# Patient Record
Sex: FEMALE | Race: WHITE | NOT HISPANIC OR LATINO | Employment: FULL TIME | URBAN - METROPOLITAN AREA
[De-identification: names, ages, dates, MRNs, and addresses within clinical notes are randomized per-mention and may not be internally consistent; named-entity substitution may affect disease eponyms.]

---

## 2017-02-21 PROBLEM — Z72.0 TOBACCO ABUSE: Status: ACTIVE | Noted: 2017-02-21

## 2017-02-21 PROBLEM — F98.8 ADD (ATTENTION DEFICIT DISORDER) WITHOUT HYPERACTIVITY: Status: ACTIVE | Noted: 2017-02-21

## 2017-02-21 PROBLEM — R00.2 PALPITATION: Status: ACTIVE | Noted: 2017-02-21

## 2017-02-27 ENCOUNTER — OFFICE VISIT (OUTPATIENT)
Dept: FAMILY MEDICINE CLINIC | Facility: CLINIC | Age: 22
End: 2017-02-27

## 2017-02-27 VITALS
HEART RATE: 99 BPM | HEIGHT: 65 IN | SYSTOLIC BLOOD PRESSURE: 90 MMHG | WEIGHT: 103 LBS | DIASTOLIC BLOOD PRESSURE: 58 MMHG | BODY MASS INDEX: 17.16 KG/M2 | OXYGEN SATURATION: 96 % | TEMPERATURE: 97.8 F

## 2017-02-27 DIAGNOSIS — G43.909 MIGRAINE WITHOUT STATUS MIGRAINOSUS, NOT INTRACTABLE, UNSPECIFIED MIGRAINE TYPE: Primary | ICD-10-CM

## 2017-02-27 DIAGNOSIS — Z72.0 TOBACCO ABUSE: ICD-10-CM

## 2017-02-27 DIAGNOSIS — F98.8 ADD (ATTENTION DEFICIT DISORDER): ICD-10-CM

## 2017-02-27 PROCEDURE — 99214 OFFICE O/P EST MOD 30 MIN: CPT | Performed by: PHYSICIAN ASSISTANT

## 2017-02-27 PROCEDURE — 99406 BEHAV CHNG SMOKING 3-10 MIN: CPT | Performed by: PHYSICIAN ASSISTANT

## 2017-02-27 RX ORDER — TOPIRAMATE 25 MG/1
25 TABLET ORAL 2 TIMES DAILY
Qty: 60 TABLET | Refills: 11 | Status: SHIPPED | OUTPATIENT
Start: 2017-02-27 | End: 2017-11-06

## 2017-04-21 ENCOUNTER — HOSPITAL ENCOUNTER (EMERGENCY)
Facility: HOSPITAL | Age: 22
Discharge: HOME | End: 2017-04-21
Attending: STUDENT IN AN ORGANIZED HEALTH CARE EDUCATION/TRAINING PROGRAM | Admitting: STUDENT IN AN ORGANIZED HEALTH CARE EDUCATION/TRAINING PROGRAM

## 2017-04-21 VITALS
BODY MASS INDEX: 17.66 KG/M2 | TEMPERATURE: 98.2 F | HEART RATE: 136 BPM | DIASTOLIC BLOOD PRESSURE: 82 MMHG | RESPIRATION RATE: 17 BRPM | OXYGEN SATURATION: 100 % | WEIGHT: 106 LBS | HEIGHT: 65 IN | SYSTOLIC BLOOD PRESSURE: 117 MMHG

## 2017-04-21 DIAGNOSIS — R14.0 BLOATING SYMPTOM: Primary | ICD-10-CM

## 2017-04-21 LAB
ALBUMIN SERPL-MCNC: 4.6 G/DL (ref 3.5–5)
ALBUMIN/GLOB SERPL: 1.2 G/DL (ref 1–2)
ALP SERPL-CCNC: 74 U/L (ref 38–126)
ALT SERPL W P-5'-P-CCNC: 28 U/L (ref 13–69)
ANION GAP SERPL CALCULATED.3IONS-SCNC: 20 MMOL/L
AST SERPL-CCNC: 24 U/L (ref 15–46)
B-HCG UR QL: NEGATIVE
BACTERIA UR QL AUTO: ABNORMAL /HPF
BASOPHILS # BLD AUTO: 0.06 10*3/MM3 (ref 0–0.2)
BASOPHILS NFR BLD AUTO: 0.9 % (ref 0–2.5)
BILIRUB SERPL-MCNC: 0.6 MG/DL (ref 0.2–1.3)
BILIRUB UR QL STRIP: ABNORMAL
BUN BLD-MCNC: 13 MG/DL (ref 7–20)
BUN/CREAT SERPL: 18.6 (ref 7.1–23.5)
CALCIUM SPEC-SCNC: 9.4 MG/DL (ref 8.4–10.2)
CHLORIDE SERPL-SCNC: 101 MMOL/L (ref 98–107)
CLARITY UR: CLEAR
CO2 SERPL-SCNC: 28 MMOL/L (ref 26–30)
COLOR UR: YELLOW
CREAT BLD-MCNC: 0.7 MG/DL (ref 0.6–1.3)
DEPRECATED RDW RBC AUTO: 39.5 FL (ref 37–54)
EOSINOPHIL # BLD AUTO: 0.12 10*3/MM3 (ref 0–0.7)
EOSINOPHIL NFR BLD AUTO: 1.8 % (ref 0–7)
ERYTHROCYTE [DISTWIDTH] IN BLOOD BY AUTOMATED COUNT: 12 % (ref 11.5–14.5)
GFR SERPL CREATININE-BSD FRML MDRD: 105 ML/MIN/1.73
GLOBULIN UR ELPH-MCNC: 3.7 GM/DL
GLUCOSE BLD-MCNC: 93 MG/DL (ref 74–98)
GLUCOSE UR STRIP-MCNC: NEGATIVE MG/DL
HCT VFR BLD AUTO: 43.3 % (ref 37–47)
HGB BLD-MCNC: 14.9 G/DL (ref 12–16)
HGB UR QL STRIP.AUTO: ABNORMAL
HYALINE CASTS UR QL AUTO: ABNORMAL /LPF
IMM GRANULOCYTES # BLD: 0.02 10*3/MM3 (ref 0–0.06)
IMM GRANULOCYTES NFR BLD: 0.3 % (ref 0–0.6)
KETONES UR QL STRIP: ABNORMAL
LEUKOCYTE ESTERASE UR QL STRIP.AUTO: NEGATIVE
LYMPHOCYTES # BLD AUTO: 2.14 10*3/MM3 (ref 0.6–3.4)
LYMPHOCYTES NFR BLD AUTO: 32.3 % (ref 10–50)
MCH RBC QN AUTO: 30.8 PG (ref 27–31)
MCHC RBC AUTO-ENTMCNC: 34.4 G/DL (ref 30–37)
MCV RBC AUTO: 89.6 FL (ref 81–99)
MONOCYTES # BLD AUTO: 0.46 10*3/MM3 (ref 0–0.9)
MONOCYTES NFR BLD AUTO: 6.9 % (ref 0–12)
NEUTROPHILS # BLD AUTO: 3.82 10*3/MM3 (ref 2–6.9)
NEUTROPHILS NFR BLD AUTO: 57.8 % (ref 37–80)
NITRITE UR QL STRIP: NEGATIVE
NRBC BLD MANUAL-RTO: 0 /100 WBC (ref 0–0)
PH UR STRIP.AUTO: 5.5 [PH] (ref 5–8)
PLATELET # BLD AUTO: 194 10*3/MM3 (ref 130–400)
PMV BLD AUTO: 11.5 FL (ref 6–12)
POTASSIUM BLD-SCNC: 4 MMOL/L (ref 3.5–5.1)
PROT SERPL-MCNC: 8.3 G/DL (ref 6.3–8.2)
PROT UR QL STRIP: ABNORMAL
RBC # BLD AUTO: 4.83 10*6/MM3 (ref 4.2–5.4)
RBC # UR: ABNORMAL /HPF
REF LAB TEST METHOD: ABNORMAL
SODIUM BLD-SCNC: 145 MMOL/L (ref 137–145)
SP GR UR STRIP: >=1.03 (ref 1–1.03)
SQUAMOUS #/AREA URNS HPF: ABNORMAL /HPF
UROBILINOGEN UR QL STRIP: ABNORMAL
WBC NRBC COR # BLD: 6.62 10*3/MM3 (ref 4.8–10.8)
WBC UR QL AUTO: ABNORMAL /HPF

## 2017-04-21 PROCEDURE — 85025 COMPLETE CBC W/AUTO DIFF WBC: CPT | Performed by: NURSE PRACTITIONER

## 2017-04-21 PROCEDURE — 81001 URINALYSIS AUTO W/SCOPE: CPT | Performed by: STUDENT IN AN ORGANIZED HEALTH CARE EDUCATION/TRAINING PROGRAM

## 2017-04-21 PROCEDURE — 99284 EMERGENCY DEPT VISIT MOD MDM: CPT

## 2017-04-21 PROCEDURE — 81025 URINE PREGNANCY TEST: CPT | Performed by: STUDENT IN AN ORGANIZED HEALTH CARE EDUCATION/TRAINING PROGRAM

## 2017-04-21 PROCEDURE — 80053 COMPREHEN METABOLIC PANEL: CPT | Performed by: NURSE PRACTITIONER

## 2017-04-21 PROCEDURE — 87491 CHLMYD TRACH DNA AMP PROBE: CPT | Performed by: NURSE PRACTITIONER

## 2017-04-21 PROCEDURE — 87591 N.GONORRHOEAE DNA AMP PROB: CPT | Performed by: NURSE PRACTITIONER

## 2017-04-21 NOTE — ED PROVIDER NOTES
"Subjective   History of Present Illness  This is a 22-year-old patient presents to the emergency department today with positive for nausea over the past 2 weeks she denies any diarrhea or vomiting she states that she's been having some abdominal bloating since that time she also complains of some mild watery vaginal discharge with odor.  .   Review of Systems   Constitutional: Negative.    HENT: Negative.    Eyes: Negative.    Respiratory: Negative.    Cardiovascular: Negative.    Gastrointestinal: Negative.    Genitourinary: Negative.    Skin: Negative.    Neurological: Negative.    Psychiatric/Behavioral: Negative.        Past Medical History:   Diagnosis Date   • ADD (attention deficit disorder) without hyperactivity    • Palpitations    • PVC (premature ventricular contraction)        No Known Allergies    Past Surgical History:   Procedure Laterality Date   • WISDOM TOOTH EXTRACTION         Family History   Problem Relation Age of Onset   • No Known Problems Mother    • No Known Problems Father    • No Known Problems Sister    • No Known Problems Brother        Social History     Social History   • Marital status: Single     Spouse name: N/A   • Number of children: N/A   • Years of education: N/A     Social History Main Topics   • Smoking status: Current Every Day Smoker     Packs/day: 0.50     Types: Cigarettes   • Smokeless tobacco: Never Used   • Alcohol use Yes      Comment: \"OCCASIONALL\"   • Drug use: No   • Sexual activity: Not Asked     Other Topics Concern   • None     Social History Narrative   • None           Objective   Physical Exam   Constitutional: She appears well-developed and well-nourished.   Nursing note and vitals reviewed.  GEN: No acute distress  Head: Normocephalic, atraumatic  Eyes: Pupils equal round reactive to light  ENT: Posterior pharynx normal in appearance, oral mucosa is moist  Chest: Nontender to palpation  Cardiovascular: Regular rate  Lungs: Clear to auscultation " bilaterally  Abdomen: Soft, nontender, nondistended, no peritoneal signs  Extremities: No edema, normal appearance  Neuro: GCS 15  Psych: Mood and affect are appropriate      Procedures         ED Course  ED Course                  MDM  Number of Diagnoses or Management Options  Bloating symptom:   Diagnosis management comments: Fragile diagnoses includes resolving gastroenteritis, STI, urinary tract infection.  Labs unremarkable STI lab testing is a sendoff we will get those labs a later date for the patient she is agreeable to follow up with her primary care provider.      Final diagnoses:   Bloating symptom            Mery Orozco, APRN  04/21/17 [id number]

## 2017-04-26 LAB
C TRACH RRNA SPEC DONR QL NAA+PROBE: NEGATIVE
N GONORRHOEA DNA SPEC QL NAA+PROBE: NEGATIVE

## 2017-05-01 ENCOUNTER — OFFICE VISIT (OUTPATIENT)
Dept: FAMILY MEDICINE CLINIC | Facility: CLINIC | Age: 22
End: 2017-05-01

## 2017-05-01 VITALS
BODY MASS INDEX: 16.66 KG/M2 | DIASTOLIC BLOOD PRESSURE: 62 MMHG | TEMPERATURE: 97.9 F | WEIGHT: 100 LBS | HEIGHT: 65 IN | OXYGEN SATURATION: 98 % | SYSTOLIC BLOOD PRESSURE: 102 MMHG | HEART RATE: 106 BPM

## 2017-05-01 DIAGNOSIS — F98.8 ADD (ATTENTION DEFICIT DISORDER): Primary | ICD-10-CM

## 2017-05-01 PROCEDURE — 99213 OFFICE O/P EST LOW 20 MIN: CPT | Performed by: PHYSICIAN ASSISTANT

## 2017-07-03 ENCOUNTER — OFFICE VISIT (OUTPATIENT)
Dept: FAMILY MEDICINE CLINIC | Facility: CLINIC | Age: 22
End: 2017-07-03

## 2017-07-03 VITALS
RESPIRATION RATE: 14 BRPM | HEART RATE: 103 BPM | HEIGHT: 65 IN | BODY MASS INDEX: 16.29 KG/M2 | SYSTOLIC BLOOD PRESSURE: 98 MMHG | WEIGHT: 97.8 LBS | OXYGEN SATURATION: 98 % | DIASTOLIC BLOOD PRESSURE: 62 MMHG

## 2017-07-03 DIAGNOSIS — F98.8 ADD (ATTENTION DEFICIT DISORDER) WITHOUT HYPERACTIVITY: Primary | ICD-10-CM
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
## 2017-07-03 PROCEDURE — 99213 OFFICE O/P EST LOW 20 MIN: CPT | Performed by: PHYSICIAN ASSISTANT

## 2017-07-03 RX ORDER — TRIAMCINOLONE ACETONIDE 1 MG/G
CREAM TOPICAL 2 TIMES DAILY
Qty: 30 G | Refills: 1 | Status: SHIPPED | OUTPATIENT
Start: 2017-07-03 | End: 2018-08-09

## 2017-07-03 NOTE — PROGRESS NOTES
Subjective   Gabriela Epperson is a 22 y.o. female    History of Present Illness  Patient pleasant 22-year-old white female comes in follow-up of ADD, states that meds working well she takes Adderall 15 mg twice a day concentration is good, focus is good while taking medication no SI/HI.      The following portions of the patient's history were reviewed and updated as appropriate: allergies, current medications, past social history and problem list    Review of Systems   Constitutional: Negative for appetite change, diaphoresis, fatigue and unexpected weight change.   Eyes: Negative for visual disturbance.   Respiratory: Negative for cough, chest tightness and shortness of breath.    Cardiovascular: Negative for chest pain, palpitations and leg swelling.   Gastrointestinal: Negative for diarrhea, nausea and vomiting.   Endocrine: Negative for polydipsia, polyphagia and polyuria.   Skin: Negative for color change and rash.   Neurological: Negative for dizziness, syncope, weakness, light-headedness, numbness and headaches.       Objective     Vitals:    07/03/17 1419   BP: 98/62   Pulse: 103   Resp: 14   SpO2: 98%       Physical Exam   Constitutional: She appears well-developed and well-nourished.   Neck: No JVD present.   Cardiovascular: Normal rate, regular rhythm, normal heart sounds, intact distal pulses and normal pulses.    No murmur heard.  Pulmonary/Chest: Effort normal and breath sounds normal. No respiratory distress.   Abdominal: Soft. Bowel sounds are normal. There is no hepatosplenomegaly. There is no tenderness.   Musculoskeletal: She exhibits no edema.   Skin: Skin is warm and dry.        Nursing note and vitals reviewed.      Assessment/Plan     Diagnoses and all orders for this visit:    ADD (attention deficit disorder) without hyperactivity    Bug bite, initial encounter  -     triamcinolone (KENALOG) 0.1 % cream; Apply  topically 2 (Two) Times a Day.    #1 Adderall 15mg twice day spent 60    2  prescriptions written    #2 Kenalog cream 0.1% apply twice a day dispense 30 g    Follow-up as needed

## 2017-09-05 ENCOUNTER — OFFICE VISIT (OUTPATIENT)
Dept: FAMILY MEDICINE CLINIC | Facility: CLINIC | Age: 22
End: 2017-09-05

## 2017-09-05 VITALS
BODY MASS INDEX: 15.99 KG/M2 | RESPIRATION RATE: 14 BRPM | HEART RATE: 90 BPM | SYSTOLIC BLOOD PRESSURE: 96 MMHG | HEIGHT: 65 IN | OXYGEN SATURATION: 97 % | DIASTOLIC BLOOD PRESSURE: 64 MMHG | WEIGHT: 96 LBS | TEMPERATURE: 98.4 F

## 2017-09-05 DIAGNOSIS — J01.00 ACUTE MAXILLARY SINUSITIS, RECURRENCE NOT SPECIFIED: Primary | ICD-10-CM

## 2017-09-05 DIAGNOSIS — F98.8 ADD (ATTENTION DEFICIT DISORDER) WITHOUT HYPERACTIVITY: ICD-10-CM

## 2017-09-05 PROCEDURE — 99214 OFFICE O/P EST MOD 30 MIN: CPT | Performed by: PHYSICIAN ASSISTANT

## 2017-09-05 RX ORDER — CEFDINIR 300 MG/1
300 CAPSULE ORAL 2 TIMES DAILY
Qty: 20 CAPSULE | Refills: 0 | Status: SHIPPED | OUTPATIENT
Start: 2017-09-05 | End: 2017-11-06

## 2017-09-05 NOTE — PROGRESS NOTES
Subjective   Gabriela Epperson is a 22 y.o. female    History of Present Illness  Patient is 20-year-old white female comes in plan of sinus pressure sinus congestion blowing green-yellow drainage from nose.  Mild frontal headache mild sore throat, mild cough.  Patient's pain with swallowing no OTC meds used    Patient comes in follow-up of ADD, meds working well takes Adderall 15 mg 3 times a day dispense 60, concentration is good, focus is good doing well medication no problems or complaints  The following portions of the patient's history were reviewed and updated as appropriate: allergies, current medications, past social history and problem list    Review of Systems   Constitutional: Negative for appetite change, chills, diaphoresis, fatigue, fever and unexpected weight change.   HENT: Positive for congestion, ear pain, postnasal drip, rhinorrhea and sinus pressure. Negative for sore throat.    Eyes: Positive for pain. Negative for visual disturbance.   Respiratory: Positive for cough. Negative for chest tightness and shortness of breath.    Cardiovascular: Negative for chest pain, palpitations and leg swelling.   Gastrointestinal: Negative for diarrhea, nausea and vomiting.   Endocrine: Negative for polydipsia, polyphagia and polyuria.   Skin: Negative for color change and rash.   Neurological: Positive for headaches. Negative for dizziness, syncope, weakness, light-headedness and numbness.   Hematological: Negative for adenopathy.       Objective     Vitals:    09/05/17 1246   BP: 96/64   Pulse: 90   Resp: 14   Temp: 98.4 °F (36.9 °C)   SpO2: 97%       Physical Exam   Constitutional: She appears well-developed and well-nourished.   HENT:   Head: Normocephalic and atraumatic.   Right Ear: Tympanic membrane and ear canal normal.   Left Ear: Tympanic membrane and ear canal normal.   Nose: Mucosal edema, rhinorrhea and sinus tenderness present. Right sinus exhibits maxillary sinus tenderness and frontal sinus  tenderness. Left sinus exhibits maxillary sinus tenderness and frontal sinus tenderness.   Mouth/Throat: Oropharynx is clear and moist. No oropharyngeal exudate.   Eyes: Pupils are equal, round, and reactive to light.   Neck: Neck supple. No JVD present. No thyromegaly present.   Cardiovascular: Normal rate, regular rhythm, normal heart sounds, intact distal pulses and normal pulses.    No murmur heard.  Pulmonary/Chest: Effort normal and breath sounds normal. No respiratory distress.   Abdominal: Soft. Bowel sounds are normal. There is no hepatosplenomegaly. There is no tenderness.   Musculoskeletal: She exhibits no edema.   Lymphadenopathy:     She has no cervical adenopathy.   Neurological: No sensory deficit.   Skin: Skin is warm and dry. She is not diaphoretic.   Nursing note and vitals reviewed.      Assessment/Plan     Diagnoses and all orders for this visit:    Acute maxillary sinusitis, recurrence not specified  -     cefdinir (OMNICEF) 300 MG capsule; Take 1 capsule by mouth 2 (Two) Times a Day.    ADD (attention deficit disorder) without hyperactivity      #1 Omnicef 300 mg 2 by mouth everyday dispense 20    OTC guaifenesin    #2 Adderall 15 mg 1 by mouth twice a day dispensed 60    2 Prescriptions written.     discuss treatment plan, long-term treatment plan discussed ways improved focus and concentration warned of abuse potential

## 2017-11-06 ENCOUNTER — OFFICE VISIT (OUTPATIENT)
Dept: FAMILY MEDICINE CLINIC | Facility: CLINIC | Age: 22
End: 2017-11-06

## 2017-11-06 VITALS
SYSTOLIC BLOOD PRESSURE: 94 MMHG | RESPIRATION RATE: 14 BRPM | DIASTOLIC BLOOD PRESSURE: 68 MMHG | WEIGHT: 99 LBS | HEIGHT: 65 IN | BODY MASS INDEX: 16.5 KG/M2 | HEART RATE: 94 BPM | OXYGEN SATURATION: 97 %

## 2017-11-06 DIAGNOSIS — F41.9 ANXIETY: ICD-10-CM

## 2017-11-06 DIAGNOSIS — F98.8 ADD (ATTENTION DEFICIT DISORDER) WITHOUT HYPERACTIVITY: Primary | ICD-10-CM

## 2017-11-06 PROCEDURE — 99214 OFFICE O/P EST MOD 30 MIN: CPT | Performed by: PHYSICIAN ASSISTANT

## 2017-11-06 RX ORDER — ESCITALOPRAM OXALATE 10 MG/1
10 TABLET ORAL DAILY
Qty: 30 TABLET | Refills: 11 | Status: SHIPPED | OUTPATIENT
Start: 2017-11-06 | End: 2018-05-08 | Stop reason: ALTCHOICE

## 2017-11-06 NOTE — PROGRESS NOTES
Subjective   Gabriela Epperson is a 22 y.o. female    History of Present Illness  Patient is a 22-year-old white female who comes in for follow-up of ADD, symptoms of concentration focus have no shortness breath no chest pain meds are working well.  No problems or complaints.  No SI/HI.    Patient also complains of anxiety depression states that she's been more stress lately she has increased anxiety symptoms no shortness breath no chest pain no nausea vomiting.  States symptoms been present for last 3-4 weeks.  No shortness breath no chest pain.      The following portions of the patient's history were reviewed and updated as appropriate: allergies, current medications, past social history and problem list    Review of Systems   Constitutional: Negative for appetite change, fatigue and unexpected weight change.   Respiratory: Negative for cough, chest tightness and shortness of breath.    Cardiovascular: Negative for chest pain, palpitations and leg swelling.   Gastrointestinal: Negative for abdominal pain, diarrhea and nausea.   Skin: Negative for color change and rash.   Neurological: Negative for dizziness, tremors, syncope, weakness, light-headedness and headaches.   Psychiatric/Behavioral: Positive for dysphoric mood and sleep disturbance. Negative for agitation, behavioral problems, confusion, decreased concentration and suicidal ideas. The patient is nervous/anxious.        Objective     Vitals:    11/06/17 1303   BP: 94/68   Pulse: 94   Resp: 14   SpO2: 97%       Physical Exam   Constitutional: She is oriented to person, place, and time. She appears well-developed and well-nourished.   Neck: No JVD present.   Cardiovascular: Normal rate, regular rhythm, normal heart sounds, intact distal pulses and normal pulses.    No murmur heard.  Pulmonary/Chest: Effort normal and breath sounds normal. No respiratory distress.   Abdominal: Soft. Bowel sounds are normal. There is no hepatosplenomegaly. There is no  tenderness.   Musculoskeletal: She exhibits no edema.        Cervical back: She exhibits decreased range of motion, tenderness and bony tenderness.   Neurological: She is alert and oriented to person, place, and time. No cranial nerve deficit.   Skin: Skin is warm and dry.   Psychiatric: She has a normal mood and affect. Her speech is normal and behavior is normal. Judgment and thought content normal. Cognition and memory are normal. She is attentive.   Nursing note and vitals reviewed.      Assessment/Plan     Diagnoses and all orders for this visit:    ADD (attention deficit disorder) without hyperactivity    Anxiety  -     escitalopram (LEXAPRO) 10 MG tablet; Take 1 tablet by mouth Daily.    #1 Adderall 15 mg 1 twice day spent 60    2 prescriptions written    #2 Lexapro 10 mg 1 by mouth everyday dispense 30×11 refills    Follow-up in 2 weeks

## 2017-12-19 ENCOUNTER — APPOINTMENT (OUTPATIENT)
Dept: GENERAL RADIOLOGY | Facility: HOSPITAL | Age: 22
End: 2017-12-19

## 2017-12-19 ENCOUNTER — HOSPITAL ENCOUNTER (EMERGENCY)
Facility: HOSPITAL | Age: 22
Discharge: HOME | End: 2017-12-19
Attending: EMERGENCY MEDICINE | Admitting: EMERGENCY MEDICINE

## 2017-12-19 VITALS
BODY MASS INDEX: 16.66 KG/M2 | WEIGHT: 100 LBS | DIASTOLIC BLOOD PRESSURE: 65 MMHG | RESPIRATION RATE: 18 BRPM | TEMPERATURE: 97.5 F | SYSTOLIC BLOOD PRESSURE: 100 MMHG | OXYGEN SATURATION: 99 % | HEART RATE: 82 BPM | HEIGHT: 65 IN

## 2017-12-19 DIAGNOSIS — S29.019A THORACIC MYOFASCIAL STRAIN, INITIAL ENCOUNTER: Primary | ICD-10-CM

## 2017-12-19 DIAGNOSIS — S39.012A LUMBAR STRAIN, INITIAL ENCOUNTER: ICD-10-CM

## 2017-12-19 LAB
B-HCG UR QL: NEGATIVE
BACTERIA UR QL AUTO: ABNORMAL /HPF
BILIRUB UR QL STRIP: NEGATIVE
CLARITY UR: ABNORMAL
COLOR UR: YELLOW
GLUCOSE UR STRIP-MCNC: NEGATIVE MG/DL
HGB UR QL STRIP.AUTO: NEGATIVE
HYALINE CASTS UR QL AUTO: ABNORMAL /LPF
KETONES UR QL STRIP: NEGATIVE
LEUKOCYTE ESTERASE UR QL STRIP.AUTO: ABNORMAL
NITRITE UR QL STRIP: NEGATIVE
PH UR STRIP.AUTO: 6 [PH] (ref 5–8)
PROT UR QL STRIP: NEGATIVE
RBC # UR: ABNORMAL /HPF
REF LAB TEST METHOD: ABNORMAL
SP GR UR STRIP: 1.01 (ref 1–1.03)
SQUAMOUS #/AREA URNS HPF: ABNORMAL /HPF
UROBILINOGEN UR QL STRIP: ABNORMAL
WBC UR QL AUTO: ABNORMAL /HPF

## 2017-12-19 PROCEDURE — 72100 X-RAY EXAM L-S SPINE 2/3 VWS: CPT

## 2017-12-19 PROCEDURE — 81001 URINALYSIS AUTO W/SCOPE: CPT | Performed by: NURSE PRACTITIONER

## 2017-12-19 PROCEDURE — 72070 X-RAY EXAM THORAC SPINE 2VWS: CPT

## 2017-12-19 PROCEDURE — 99283 EMERGENCY DEPT VISIT LOW MDM: CPT

## 2017-12-19 PROCEDURE — 81025 URINE PREGNANCY TEST: CPT | Performed by: NURSE PRACTITIONER

## 2017-12-19 RX ORDER — METHOCARBAMOL 750 MG/1
750 TABLET, FILM COATED ORAL 3 TIMES DAILY PRN
Qty: 21 TABLET | Refills: 0 | Status: SHIPPED | OUTPATIENT
Start: 2017-12-19 | End: 2018-01-09

## 2017-12-19 RX ORDER — MELOXICAM 7.5 MG/1
7.5 TABLET ORAL DAILY
Qty: 7 TABLET | Refills: 0 | Status: SHIPPED | OUTPATIENT
Start: 2017-12-19 | End: 2018-01-09

## 2017-12-20 NOTE — ED PROVIDER NOTES
"Subjective   History of Present Illness  This is a 22-year-old female who comes in today complaining of mid and lower back pain.  She states that the pain started after a huge box fell off the shelf at work full of lettuce and hit her in the back yesterday.  She states she felt pain yesterday, however, the pain has progressively gotten worse today.  She denies any loss of bowel or bladder.  She does have trouble with any movement.  She denies any dysuria.  She has not taken anything for pain.  He does not want anything for pain today.  Review of Systems   Constitutional: Negative.    HENT: Negative.    Eyes: Negative.    Respiratory: Negative.    Cardiovascular: Negative.    Gastrointestinal: Negative.    Genitourinary: Negative.    Musculoskeletal: Positive for back pain.   Skin: Negative.    Neurological: Negative.    Psychiatric/Behavioral: Negative.    All other systems reviewed and are negative.      Past Medical History:   Diagnosis Date   • ADD (attention deficit disorder) without hyperactivity    • Palpitations    • PVC (premature ventricular contraction)        No Known Allergies    Past Surgical History:   Procedure Laterality Date   • WISDOM TOOTH EXTRACTION         Family History   Problem Relation Age of Onset   • No Known Problems Mother    • No Known Problems Father    • No Known Problems Sister    • No Known Problems Brother        Social History     Social History   • Marital status: Single     Spouse name: N/A   • Number of children: N/A   • Years of education: N/A     Social History Main Topics   • Smoking status: Current Every Day Smoker     Packs/day: 0.50     Types: Cigarettes   • Smokeless tobacco: Never Used   • Alcohol use Yes      Comment: \"OCCASIONALL\"   • Drug use: No   • Sexual activity: Not Asked     Other Topics Concern   • None     Social History Narrative           Objective   Physical Exam   Constitutional: She appears well-developed and well-nourished.   Nursing note and vitals " reviewed.  GEN: No acute distress  Head: Normocephalic, atraumatic  Eyes: Pupils equal round reactive to light  ENT: Posterior pharynx normal in appearance, oral mucosa is moist  Chest: Nontender to palpation  Cardiovascular: Regular rate  Lungs: Clear to auscultation bilaterally  Abdomen: Soft, nontender, nondistended, no peritoneal signs  Extremities: No edema, normal appearance  Neuro: GCS 15, negative straight leg raise, tender T-spine down to her lumbosacral spine.  Psych: Mood and affect are appropriate      Procedures         ED Course  ED Course                  MDM  Number of Diagnoses or Management Options     Amount and/or Complexity of Data Reviewed  Clinical lab tests: ordered and reviewed  Tests in the radiology section of CPT®: ordered and reviewed  Discuss the patient with other providers: yes    Risk of Complications, Morbidity, and/or Mortality  Presenting problems: moderate  Diagnostic procedures: moderate  Management options: moderate        Final diagnoses:   Thoracic myofascial strain, initial encounter   Lumbar strain, initial encounter            Mery Orozco, APRN  12/19/17 [id number]

## 2018-01-09 ENCOUNTER — OFFICE VISIT (OUTPATIENT)
Dept: FAMILY MEDICINE CLINIC | Facility: CLINIC | Age: 23
End: 2018-01-09

## 2018-01-09 VITALS
DIASTOLIC BLOOD PRESSURE: 58 MMHG | WEIGHT: 101.4 LBS | HEIGHT: 65 IN | OXYGEN SATURATION: 98 % | RESPIRATION RATE: 14 BRPM | BODY MASS INDEX: 16.89 KG/M2 | SYSTOLIC BLOOD PRESSURE: 90 MMHG | HEART RATE: 80 BPM

## 2018-01-09 DIAGNOSIS — F41.9 ANXIETY: ICD-10-CM

## 2018-01-09 DIAGNOSIS — F98.8 ADD (ATTENTION DEFICIT DISORDER) WITHOUT HYPERACTIVITY: Primary | ICD-10-CM

## 2018-01-09 PROCEDURE — 99213 OFFICE O/P EST LOW 20 MIN: CPT | Performed by: PHYSICIAN ASSISTANT

## 2018-01-09 RX ORDER — CITALOPRAM 20 MG/1
20 TABLET ORAL DAILY
Qty: 30 TABLET | Refills: 1 | Status: SHIPPED | OUTPATIENT
Start: 2018-01-09 | End: 2018-08-09

## 2018-01-09 NOTE — PROGRESS NOTES
Subjective   Gabriela Epperson is a 22 y.o. female    History of Present Illness  Patient is a pleasant 20-year-old white female comes in complaining of ADD symptoms, needs refill on Adderall 30 mg twice a day no shortness breath no chest pain.  Meds working well    Patient is trouble with anxiety trouble with mood swings concentration states had been on Lexapro in the past had side effects like to try something else no SI/HI.  No problems or complaints.  The following portions of the patient's history were reviewed and updated as appropriate: allergies, current medications, past social history and problem list    Review of Systems   Constitutional: Negative for fatigue and unexpected weight change.   Respiratory: Negative for cough, chest tightness and shortness of breath.    Cardiovascular: Negative for chest pain, palpitations and leg swelling.   Gastrointestinal: Negative for nausea.   Skin: Negative for color change and rash.   Neurological: Negative for dizziness, syncope, weakness and headaches.       Objective     Vitals:    01/09/18 1308   BP: 90/58   Pulse: 80   Resp: 14   SpO2: 98%       Physical Exam   Constitutional: She appears well-developed and well-nourished.   Neck: Neck supple. No JVD present. No thyromegaly present.   Cardiovascular: Normal rate, regular rhythm, normal heart sounds, intact distal pulses and normal pulses.    No murmur heard.  Pulmonary/Chest: Effort normal and breath sounds normal. No respiratory distress.   Abdominal: Soft. Bowel sounds are normal. There is no hepatosplenomegaly. There is no tenderness.   Musculoskeletal: She exhibits no edema.   Lymphadenopathy:     She has no cervical adenopathy.   Neurological: No sensory deficit.   Skin: Skin is warm and dry. She is not diaphoretic.   Nursing note and vitals reviewed.      Assessment/Plan     Diagnoses and all orders for this visit:    ADD (attention deficit disorder) without hyperactivity    Anxiety  -     citalopram  (CELEXA) 20 MG tablet; Take 1 tablet by mouth Daily.    #1 Adderall 15 mg twice day disp 60  2 prescriptions given    #2 citalopram 20 mg 1 by mouth everyday dispense 30

## 2018-03-08 ENCOUNTER — OFFICE VISIT (OUTPATIENT)
Dept: FAMILY MEDICINE CLINIC | Facility: CLINIC | Age: 23
End: 2018-03-08

## 2018-03-08 VITALS
WEIGHT: 103.6 LBS | RESPIRATION RATE: 14 BRPM | HEIGHT: 65 IN | OXYGEN SATURATION: 97 % | HEART RATE: 94 BPM | SYSTOLIC BLOOD PRESSURE: 98 MMHG | BODY MASS INDEX: 17.26 KG/M2 | DIASTOLIC BLOOD PRESSURE: 64 MMHG

## 2018-03-08 DIAGNOSIS — F98.8 ATTENTION DEFICIT DISORDER (ADD) WITHOUT HYPERACTIVITY: Primary | ICD-10-CM

## 2018-03-08 PROCEDURE — 99213 OFFICE O/P EST LOW 20 MIN: CPT | Performed by: PHYSICIAN ASSISTANT

## 2018-03-08 NOTE — PROGRESS NOTES
Subjective   Gabriela Epperson is a 23 y.o. female  ADD (RF Adderall 15mg BID. Last filled Feb 24 per TIFFANIE.)      History of Present Illness  Patient is a pleasant 23-year-old white female comes in complaining of ADD, meds working well no problems or complaints no shortness breath no chest pain.  Takes Adderall 50 mg twice day meds working well no problems or complaints      The following portions of the patient's history were reviewed and updated as appropriate: allergies, current medications, past social history and problem list    Review of Systems   Constitutional: Negative for appetite change, diaphoresis, fatigue and unexpected weight change.   Eyes: Negative for visual disturbance.   Respiratory: Negative for cough, chest tightness and shortness of breath.    Cardiovascular: Negative for chest pain, palpitations and leg swelling.   Gastrointestinal: Negative for diarrhea, nausea and vomiting.   Endocrine: Negative for polydipsia, polyphagia and polyuria.   Skin: Negative for color change and rash.   Neurological: Negative for dizziness, syncope, weakness, light-headedness, numbness and headaches.       Objective     Vitals:    03/08/18 1352   BP: 98/64   Pulse: 94   Resp: 14   SpO2: 97%       Physical Exam   Constitutional: She appears well-developed and well-nourished.   Neck: Neck supple. No JVD present. No thyromegaly present.   Cardiovascular: Normal rate, regular rhythm, normal heart sounds, intact distal pulses and normal pulses.    No murmur heard.  Pulmonary/Chest: Effort normal and breath sounds normal. No respiratory distress.   Abdominal: Soft. Bowel sounds are normal. There is no hepatosplenomegaly. There is no tenderness.   Musculoskeletal: She exhibits no edema.   Lymphadenopathy:     She has no cervical adenopathy.   Neurological: No sensory deficit.   Skin: Skin is warm and dry. She is not diaphoretic.   Nursing note and vitals reviewed.      Assessment/Plan     Diagnoses and all orders  for this visit:    Attention deficit disorder (ADD) without hyperactivity    #1 Adderall 15 mg twice a day dispense 60    2 prescriptions written    Time sent with patient 145 to 2:00 PM 9 minutes that time u    He needs tosed discussed treatment plan long-term treatment plan.

## 2018-03-17 ENCOUNTER — DOCUMENTATION (OUTPATIENT)
Dept: EMERGENCY DEPT | Facility: HOSPITAL | Age: 23
End: 2018-03-17

## 2018-03-17 ENCOUNTER — HOSPITAL ENCOUNTER (EMERGENCY)
Facility: HOSPITAL | Age: 23
Discharge: HOME | End: 2018-03-17
Attending: EMERGENCY MEDICINE | Admitting: EMERGENCY MEDICINE

## 2018-03-17 VITALS
DIASTOLIC BLOOD PRESSURE: 65 MMHG | RESPIRATION RATE: 18 BRPM | TEMPERATURE: 97.8 F | OXYGEN SATURATION: 97 % | SYSTOLIC BLOOD PRESSURE: 91 MMHG | WEIGHT: 103 LBS | HEIGHT: 65 IN | BODY MASS INDEX: 17.16 KG/M2 | HEART RATE: 101 BPM

## 2018-03-17 DIAGNOSIS — R45.851 SUICIDAL IDEATION: Primary | ICD-10-CM

## 2018-03-17 DIAGNOSIS — F32.A DEPRESSION, UNSPECIFIED DEPRESSION TYPE: ICD-10-CM

## 2018-03-17 LAB
ALBUMIN SERPL-MCNC: 4.6 G/DL (ref 3.5–5)
ALBUMIN/GLOB SERPL: 1.4 G/DL (ref 1–2)
ALP SERPL-CCNC: 60 U/L (ref 38–126)
ALT SERPL W P-5'-P-CCNC: 21 U/L (ref 13–69)
AMPHET+METHAMPHET UR QL: POSITIVE
AMPHETAMINES UR QL: NEGATIVE
ANION GAP SERPL CALCULATED.3IONS-SCNC: 19.3 MMOL/L
AST SERPL-CCNC: 21 U/L (ref 15–46)
B-HCG UR QL: NEGATIVE
BARBITURATES UR QL SCN: NEGATIVE
BASOPHILS # BLD AUTO: 0.06 10*3/MM3 (ref 0–0.2)
BASOPHILS NFR BLD AUTO: 0.8 % (ref 0–2.5)
BENZODIAZ UR QL SCN: NEGATIVE
BILIRUB SERPL-MCNC: 0.6 MG/DL (ref 0.2–1.3)
BILIRUB UR QL STRIP: NEGATIVE
BUN BLD-MCNC: 7 MG/DL (ref 7–20)
BUN/CREAT SERPL: 11.7 (ref 7.1–23.5)
BUPRENORPHINE SERPL-MCNC: NEGATIVE NG/ML
CALCIUM SPEC-SCNC: 9.3 MG/DL (ref 8.4–10.2)
CANNABINOIDS SERPL QL: NEGATIVE
CHLORIDE SERPL-SCNC: 108 MMOL/L (ref 98–107)
CLARITY UR: CLEAR
CO2 SERPL-SCNC: 26 MMOL/L (ref 26–30)
COCAINE UR QL: NEGATIVE
COLOR UR: YELLOW
CREAT BLD-MCNC: 0.6 MG/DL (ref 0.6–1.3)
DEPRECATED RDW RBC AUTO: 41.1 FL (ref 37–54)
EOSINOPHIL # BLD AUTO: 0.19 10*3/MM3 (ref 0–0.7)
EOSINOPHIL NFR BLD AUTO: 2.5 % (ref 0–7)
ERYTHROCYTE [DISTWIDTH] IN BLOOD BY AUTOMATED COUNT: 12.4 % (ref 11.5–14.5)
ETHANOL BLD-MCNC: 134 MG/DL
ETHANOL BLD-MCNC: 75 MG/DL
ETHANOL UR QL: 0.07 %
ETHANOL UR QL: 0.13 %
GFR SERPL CREATININE-BSD FRML MDRD: 124 ML/MIN/1.73
GLOBULIN UR ELPH-MCNC: 3.4 GM/DL
GLUCOSE BLD-MCNC: 96 MG/DL (ref 74–98)
GLUCOSE UR STRIP-MCNC: NEGATIVE MG/DL
HCT VFR BLD AUTO: 41.1 % (ref 37–47)
HGB BLD-MCNC: 14.4 G/DL (ref 12–16)
HGB UR QL STRIP.AUTO: NEGATIVE
IMM GRANULOCYTES # BLD: 0.02 10*3/MM3 (ref 0–0.06)
IMM GRANULOCYTES NFR BLD: 0.3 % (ref 0–0.6)
KETONES UR QL STRIP: NEGATIVE
LEUKOCYTE ESTERASE UR QL STRIP.AUTO: NEGATIVE
LYMPHOCYTES # BLD AUTO: 2.6 10*3/MM3 (ref 0.6–3.4)
LYMPHOCYTES NFR BLD AUTO: 34.5 % (ref 10–50)
MAGNESIUM SERPL-MCNC: 2.4 MG/DL (ref 1.6–2.3)
MCH RBC QN AUTO: 31.6 PG (ref 27–31)
MCHC RBC AUTO-ENTMCNC: 35 G/DL (ref 30–37)
MCV RBC AUTO: 90.1 FL (ref 81–99)
METHADONE UR QL SCN: NEGATIVE
MONOCYTES # BLD AUTO: 0.34 10*3/MM3 (ref 0–0.9)
MONOCYTES NFR BLD AUTO: 4.5 % (ref 0–12)
NEUTROPHILS # BLD AUTO: 4.32 10*3/MM3 (ref 2–6.9)
NEUTROPHILS NFR BLD AUTO: 57.4 % (ref 37–80)
NITRITE UR QL STRIP: NEGATIVE
NRBC BLD MANUAL-RTO: 0 /100 WBC (ref 0–0)
OPIATES UR QL: NEGATIVE
OXYCODONE UR QL SCN: NEGATIVE
PCP UR QL SCN: NEGATIVE
PH UR STRIP.AUTO: 6.5 [PH] (ref 5–8)
PLATELET # BLD AUTO: 166 10*3/MM3 (ref 130–400)
PMV BLD AUTO: 11.2 FL (ref 6–12)
POTASSIUM BLD-SCNC: 3.3 MMOL/L (ref 3.5–5.1)
PROPOXYPH UR QL: NEGATIVE
PROT SERPL-MCNC: 8 G/DL (ref 6.3–8.2)
PROT UR QL STRIP: NEGATIVE
RBC # BLD AUTO: 4.56 10*6/MM3 (ref 4.2–5.4)
SODIUM BLD-SCNC: 150 MMOL/L (ref 137–145)
SP GR UR STRIP: 1.01 (ref 1–1.03)
TRICYCLICS UR QL SCN: NEGATIVE
UROBILINOGEN UR QL STRIP: NORMAL
WBC NRBC COR # BLD: 7.53 10*3/MM3 (ref 4.8–10.8)

## 2018-03-17 PROCEDURE — 99284 EMERGENCY DEPT VISIT MOD MDM: CPT

## 2018-03-17 PROCEDURE — 80053 COMPREHEN METABOLIC PANEL: CPT | Performed by: EMERGENCY MEDICINE

## 2018-03-17 PROCEDURE — 36415 COLL VENOUS BLD VENIPUNCTURE: CPT

## 2018-03-17 PROCEDURE — 80306 DRUG TEST PRSMV INSTRMNT: CPT | Performed by: EMERGENCY MEDICINE

## 2018-03-17 PROCEDURE — 85025 COMPLETE CBC W/AUTO DIFF WBC: CPT | Performed by: EMERGENCY MEDICINE

## 2018-03-17 PROCEDURE — 83735 ASSAY OF MAGNESIUM: CPT | Performed by: EMERGENCY MEDICINE

## 2018-03-17 PROCEDURE — 80307 DRUG TEST PRSMV CHEM ANLYZR: CPT | Performed by: EMERGENCY MEDICINE

## 2018-03-17 PROCEDURE — 93005 ELECTROCARDIOGRAM TRACING: CPT | Performed by: EMERGENCY MEDICINE

## 2018-03-17 PROCEDURE — 81003 URINALYSIS AUTO W/O SCOPE: CPT | Performed by: EMERGENCY MEDICINE

## 2018-03-17 PROCEDURE — 81025 URINE PREGNANCY TEST: CPT | Performed by: EMERGENCY MEDICINE
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
## 2018-03-17 NOTE — ED NOTES
Per behavioral health, patient is cleared to be discharged home.Pt was given resources for outpatient therapy.     Mag Houston RN  03/17/18 5344
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
## 2018-03-17 NOTE — ED NOTES
Contacted Bonita from behavioral health, stated she would come in to evaluate patient.     Mag Houston RN  03/17/18 7371

## 2018-03-17 NOTE — ED NOTES
Patient states she feels better at this time, denies SI/HI at this time.      Mag Houston RN  03/17/18 7569

## 2018-05-08 ENCOUNTER — OFFICE VISIT (OUTPATIENT)
Dept: FAMILY MEDICINE CLINIC | Facility: CLINIC | Age: 23
End: 2018-05-08

## 2018-05-08 VITALS
RESPIRATION RATE: 14 BRPM | DIASTOLIC BLOOD PRESSURE: 63 MMHG | HEART RATE: 76 BPM | BODY MASS INDEX: 17.33 KG/M2 | SYSTOLIC BLOOD PRESSURE: 100 MMHG | HEIGHT: 65 IN | OXYGEN SATURATION: 98 % | WEIGHT: 104 LBS

## 2018-05-08 DIAGNOSIS — F98.8 ATTENTION DEFICIT DISORDER (ADD) WITHOUT HYPERACTIVITY: Primary | ICD-10-CM

## 2018-05-08 PROCEDURE — 99213 OFFICE O/P EST LOW 20 MIN: CPT | Performed by: PHYSICIAN ASSISTANT

## 2018-05-08 NOTE — PROGRESS NOTES
Subjective   Gabriela Epperson is a 23 y.o. female  ADD (RF Adderall 15mg BID)      History of Present Illness     Patient is a 23-year-old white female comes in complaining of ADD, trouble concentration focus is working well no problems or complaints takes Adderall 50 mg twice a day no SI/HI.  The following portions of the patient's history were reviewed and updated as appropriate: allergies, current medications, past social history and problem list    Review of Systems   Constitutional: Negative for appetite change, diaphoresis, fatigue and unexpected weight change.   Eyes: Negative for visual disturbance.   Respiratory: Negative for cough, chest tightness and shortness of breath.    Cardiovascular: Negative for chest pain, palpitations and leg swelling.   Gastrointestinal: Negative for diarrhea, nausea and vomiting.   Endocrine: Negative for polydipsia, polyphagia and polyuria.   Skin: Negative for color change and rash.   Neurological: Negative for dizziness, syncope, weakness, light-headedness, numbness and headaches.       Objective     Vitals:    05/08/18 1023   BP: 100/63   Pulse: 76   Resp: 14   SpO2: 98%       Physical Exam   Constitutional: She appears well-developed and well-nourished.   Neck: Neck supple. No JVD present. No thyromegaly present.   Cardiovascular: Normal rate, regular rhythm, normal heart sounds, intact distal pulses and normal pulses.    No murmur heard.  Pulmonary/Chest: Effort normal and breath sounds normal. No respiratory distress.   Abdominal: Soft. Bowel sounds are normal. There is no hepatosplenomegaly. There is no tenderness.   Musculoskeletal: She exhibits no edema.   Lymphadenopathy:     She has no cervical adenopathy.   Neurological: No sensory deficit.   Skin: Skin is warm and dry. She is not diaphoretic.   Nursing note and vitals reviewed.      Assessment/Plan     Diagnoses and all orders for this visit:    Attention deficit disorder (ADD) without hyperactivity    #1 time  spent with patient Adderall 15 mg twice a day dispense 60    2 prescriptions given    Time spent with patient 11:15 11:30 AM, 9 minutes time he's discussed treating plan long-term treatment plan discussed ways improved focus and concentration

## 2018-08-09 ENCOUNTER — OFFICE VISIT (OUTPATIENT)
Dept: FAMILY MEDICINE CLINIC | Facility: CLINIC | Age: 23
End: 2018-08-09

## 2018-08-09 VITALS
HEIGHT: 65 IN | WEIGHT: 100 LBS | OXYGEN SATURATION: 99 % | HEART RATE: 100 BPM | RESPIRATION RATE: 16 BRPM | SYSTOLIC BLOOD PRESSURE: 88 MMHG | BODY MASS INDEX: 16.66 KG/M2 | DIASTOLIC BLOOD PRESSURE: 60 MMHG

## 2018-08-09 DIAGNOSIS — F98.8 ATTENTION DEFICIT DISORDER, UNSPECIFIED HYPERACTIVITY PRESENCE: Primary | ICD-10-CM

## 2018-08-09 PROCEDURE — 99213 OFFICE O/P EST LOW 20 MIN: CPT | Performed by: PHYSICIAN ASSISTANT

## 2018-08-09 NOTE — PROGRESS NOTES
Subjective   Gabriela Epperson is a 23 y.o. female  ADD (RF Adderall 15mg BID)      History of Present Illness  Patient pleasant 23-year-old white female comes in for ADD needs refill medication no problems or complaints takes Adderall 50 mg twice day no SI/HI concentration is good focus is good  The following portions of the patient's history were reviewed and updated as appropriate: allergies, current medications, past social history and problem list    Review of Systems   Constitutional: Negative for appetite change, diaphoresis, fatigue and unexpected weight change.   Eyes: Negative for visual disturbance.   Respiratory: Negative for cough, chest tightness and shortness of breath.    Cardiovascular: Negative for chest pain, palpitations and leg swelling.   Gastrointestinal: Negative for diarrhea, nausea and vomiting.   Endocrine: Negative for polydipsia, polyphagia and polyuria.   Skin: Negative for color change and rash.   Neurological: Negative for dizziness, syncope, weakness, light-headedness, numbness and headaches.       Objective     Vitals:    08/09/18 1320   BP: (!) 88/60   Pulse: 100   Resp: 16   SpO2: 99%       Physical Exam   Constitutional: She appears well-developed and well-nourished.   Neck: Neck supple. No JVD present. No thyromegaly present.   Cardiovascular: Normal rate, regular rhythm, normal heart sounds, intact distal pulses and normal pulses.    No murmur heard.  Pulmonary/Chest: Effort normal and breath sounds normal. No respiratory distress.   Abdominal: Soft. Bowel sounds are normal. There is no hepatosplenomegaly. There is no tenderness.   Musculoskeletal: She exhibits no edema.   Lymphadenopathy:     She has no cervical adenopathy.   Neurological: No sensory deficit.   Skin: Skin is warm and dry. She is not diaphoretic.   Nursing note and vitals reviewed.      Assessment/Plan     Diagnoses and all orders for this visit:    Attention deficit disorder, unspecified hyperactivity  presence    #1 Adderall 15 mg twice day dispensed 60    2 prescriptions given    Time spent with 1:15 to 1:30 AM, 9 minutes that time used to discuss treatment plan long-term treatment plan warned of abuse potential

## 2018-10-08 ENCOUNTER — OFFICE VISIT (OUTPATIENT)
Dept: FAMILY MEDICINE CLINIC | Facility: CLINIC | Age: 23
End: 2018-10-08

## 2018-10-08 VITALS
HEIGHT: 65 IN | DIASTOLIC BLOOD PRESSURE: 64 MMHG | SYSTOLIC BLOOD PRESSURE: 90 MMHG | OXYGEN SATURATION: 100 % | WEIGHT: 104.8 LBS | BODY MASS INDEX: 17.46 KG/M2 | HEART RATE: 81 BPM | RESPIRATION RATE: 14 BRPM

## 2018-10-08 DIAGNOSIS — F98.8 ADD (ATTENTION DEFICIT DISORDER) WITHOUT HYPERACTIVITY: Primary | ICD-10-CM

## 2018-10-08 PROCEDURE — 99213 OFFICE O/P EST LOW 20 MIN: CPT | Performed by: PHYSICIAN ASSISTANT

## 2018-10-08 NOTE — PROGRESS NOTES
Subjective   Gabriela Epperson is a 23 y.o. female  ADD (RF Adderall 15mg BID)      History of Present Illness  Patient is a 23-year-old white female comes in for follow-up of ADD takes Adderall 50 mg twice a day no SI/HI concentration good focus is good meds working well, no SI/HI, concentration good focus is good  The following portions of the patient's history were reviewed and updated as appropriate: allergies, current medications, past social history and problem list    Review of Systems   Constitutional: Negative for appetite change, diaphoresis, fatigue and unexpected weight change.   Eyes: Negative for visual disturbance.   Respiratory: Negative for cough, chest tightness and shortness of breath.    Cardiovascular: Negative for chest pain, palpitations and leg swelling.   Gastrointestinal: Negative for diarrhea, nausea and vomiting.   Endocrine: Negative for polydipsia, polyphagia and polyuria.   Skin: Negative for color change and rash.   Neurological: Negative for dizziness, syncope, weakness, light-headedness, numbness and headaches.       Objective     Vitals:    10/08/18 1320   BP: 90/64   Pulse: 81   Resp: 14   SpO2: 100%       Physical Exam   Constitutional: She appears well-developed and well-nourished.   Neck: Neck supple. No JVD present. No thyromegaly present.   Cardiovascular: Normal rate, regular rhythm, normal heart sounds, intact distal pulses and normal pulses.    No murmur heard.  Pulmonary/Chest: Effort normal and breath sounds normal. No respiratory distress.   Abdominal: Soft. Bowel sounds are normal. There is no hepatosplenomegaly. There is no tenderness.   Musculoskeletal: She exhibits no edema.   Lymphadenopathy:     She has no cervical adenopathy.   Neurological: No sensory deficit.   Skin: Skin is warm and dry. She is not diaphoretic.   Nursing note and vitals reviewed.      Assessment/Plan     Diagnoses and all orders for this visit:    ADD (attention deficit disorder) without  hyperactivity     #1 adderall 15 mg  BID #60    Time spent with patient 1:15 to 1:30 PM, 9 minutes that time he's discussed treatment plan long-term treatment plan discussed ways improve overall focus and concentration

## 2018-12-07 ENCOUNTER — OFFICE VISIT (OUTPATIENT)
Dept: FAMILY MEDICINE CLINIC | Facility: CLINIC | Age: 23
End: 2018-12-07

## 2018-12-07 VITALS
RESPIRATION RATE: 14 BRPM | HEIGHT: 65 IN | DIASTOLIC BLOOD PRESSURE: 70 MMHG | BODY MASS INDEX: 17.44 KG/M2 | SYSTOLIC BLOOD PRESSURE: 102 MMHG | OXYGEN SATURATION: 98 % | HEART RATE: 107 BPM

## 2018-12-07 DIAGNOSIS — F98.8 ADD (ATTENTION DEFICIT DISORDER) WITHOUT HYPERACTIVITY: Primary | ICD-10-CM

## 2018-12-07 PROCEDURE — 99213 OFFICE O/P EST LOW 20 MIN: CPT | Performed by: PHYSICIAN ASSISTANT

## 2018-12-07 NOTE — PROGRESS NOTES
Subjective   Gabriela Epperson is a 23 y.o. female  ADD (RF Adderall 15mg BID)      History of Present Illness  Patient is 23-year-old white female comes in complaining of ADD, meds working well no problems or complaints, takes Adderall 15 mg twice a day no SI/HI concentration is good focus is good meds working well on taking medication.  Meds working well no SI/HI      The following portions of the patient's history were reviewed and updated as appropriate: allergies, current medications, past social history and problem list    Review of Systems   Constitutional: Negative for appetite change, diaphoresis, fatigue and unexpected weight change.   Eyes: Negative for visual disturbance.   Respiratory: Negative for cough, chest tightness and shortness of breath.    Cardiovascular: Negative for chest pain, palpitations and leg swelling.   Gastrointestinal: Negative for diarrhea, nausea and vomiting.   Endocrine: Negative for polydipsia, polyphagia and polyuria.   Skin: Negative for color change and rash.   Neurological: Negative for dizziness, syncope, weakness, light-headedness, numbness and headaches.       Objective     Vitals:    12/07/18 1405   BP: 102/70   Pulse: 107   Resp: 14   SpO2: 98%       Physical Exam   Constitutional: She appears well-developed and well-nourished.   Neck: Neck supple. No JVD present. No thyromegaly present.   Cardiovascular: Normal rate, regular rhythm, normal heart sounds, intact distal pulses and normal pulses.   No murmur heard.  Pulmonary/Chest: Effort normal and breath sounds normal. No respiratory distress.   Abdominal: Soft. Bowel sounds are normal. There is no hepatosplenomegaly. There is no tenderness.   Musculoskeletal: She exhibits no edema.   Lymphadenopathy:     She has no cervical adenopathy.   Neurological: No sensory deficit.   Skin: Skin is warm and dry. She is not diaphoretic.   Nursing note and vitals reviewed.      Assessment/Plan     Diagnoses and all orders for this  visit:    ADD (attention deficit disorder) without hyperactivity    Adderall 15 mg twice day dispense 60    2 prescriptions given    Time spent with patient 8:15 8:30 AM, 9 minutes that time he's discussed treatment plan long-term treatment plan discussed ways improved focus and concentration

## 2018-12-18 ENCOUNTER — HOSPITAL ENCOUNTER (EMERGENCY)
Facility: HOSPITAL | Age: 23
Discharge: HOME | End: 2018-12-18
Attending: EMERGENCY MEDICINE | Admitting: EMERGENCY MEDICINE

## 2018-12-18 VITALS
DIASTOLIC BLOOD PRESSURE: 76 MMHG | WEIGHT: 104.4 LBS | HEART RATE: 105 BPM | BODY MASS INDEX: 16.78 KG/M2 | SYSTOLIC BLOOD PRESSURE: 105 MMHG | HEIGHT: 66 IN | OXYGEN SATURATION: 100 % | RESPIRATION RATE: 16 BRPM | TEMPERATURE: 97.4 F

## 2018-12-18 DIAGNOSIS — M54.32 SCIATICA OF LEFT SIDE: Primary | ICD-10-CM

## 2018-12-18 PROCEDURE — 99283 EMERGENCY DEPT VISIT LOW MDM: CPT

## 2018-12-18 PROCEDURE — 25010000002 KETOROLAC TROMETHAMINE PER 15 MG: Performed by: PHYSICIAN ASSISTANT

## 2018-12-18 PROCEDURE — 96372 THER/PROPH/DIAG INJ SC/IM: CPT

## 2018-12-18 PROCEDURE — 25010000002 METHYLPREDNISOLONE PER 125 MG: Performed by: PHYSICIAN ASSISTANT

## 2018-12-18 RX ORDER — KETOROLAC TROMETHAMINE 30 MG/ML
30 INJECTION, SOLUTION INTRAMUSCULAR; INTRAVENOUS ONCE
Status: COMPLETED | OUTPATIENT
Start: 2018-12-18 | End: 2018-12-18

## 2018-12-18 RX ORDER — METHOCARBAMOL 500 MG/1
500 TABLET, FILM COATED ORAL 4 TIMES DAILY
Qty: 12 TABLET | Refills: 0 | Status: SHIPPED | OUTPATIENT
Start: 2018-12-18 | End: 2018-12-21

## 2018-12-18 RX ORDER — INDOMETHACIN 25 MG/1
25 CAPSULE ORAL 3 TIMES DAILY PRN
Qty: 10 CAPSULE | Refills: 0 | Status: SHIPPED | OUTPATIENT
Start: 2018-12-18 | End: 2019-05-17

## 2018-12-18 RX ORDER — METHYLPREDNISOLONE SODIUM SUCCINATE 125 MG/2ML
80 INJECTION, POWDER, LYOPHILIZED, FOR SOLUTION INTRAMUSCULAR; INTRAVENOUS ONCE
Status: COMPLETED | OUTPATIENT
Start: 2018-12-18 | End: 2018-12-18

## 2018-12-18 RX ORDER — PREDNISONE 20 MG/1
40 TABLET ORAL DAILY
Qty: 6 TABLET | Refills: 0 | Status: SHIPPED | OUTPATIENT
Start: 2018-12-18 | End: 2018-12-21

## 2018-12-18 RX ADMIN — KETOROLAC TROMETHAMINE 30 MG: 30 INJECTION, SOLUTION INTRAMUSCULAR at 10:46

## 2018-12-18 RX ADMIN — METHYLPREDNISOLONE SODIUM SUCCINATE 80 MG: 125 INJECTION, POWDER, FOR SOLUTION INTRAMUSCULAR; INTRAVENOUS at 10:46

## 2018-12-18 NOTE — ED PROVIDER NOTES
"Subjective   23-year-old female presents from work with back pain.  Patient states she bent over when she started experiencing pain in her low back.  Patient states this pain is mainly in her left lower back and radiates down her left leg into her thigh.  Patient denies blunt injury or trauma.  No recent illness.  Denies fever or IV drug abuse.  Denies saddle paresthesia, bowel or bladder dysfunction.  Patient states that she's had similar back pain in the past due to sciatica.  Patient denies previous back fractures or surgery.  Patient is able to ambulate without difficulty.            Review of Systems   Musculoskeletal: Positive for back pain.   All other systems reviewed and are negative.      Past Medical History:   Diagnosis Date   • ADD (attention deficit disorder) without hyperactivity    • Palpitations    • PVC (premature ventricular contraction)        No Known Allergies    Past Surgical History:   Procedure Laterality Date   • WISDOM TOOTH EXTRACTION         Family History   Problem Relation Age of Onset   • No Known Problems Mother    • No Known Problems Father    • No Known Problems Sister    • No Known Problems Brother        Social History     Socioeconomic History   • Marital status: Single     Spouse name: Not on file   • Number of children: Not on file   • Years of education: Not on file   • Highest education level: Not on file   Tobacco Use   • Smoking status: Current Every Day Smoker     Packs/day: 0.50     Types: Cigarettes   • Smokeless tobacco: Never Used   Substance and Sexual Activity   • Alcohol use: Yes     Comment: \"OCCASIONALL\"   • Drug use: No           Objective   Physical Exam   Constitutional: She is oriented to person, place, and time. She appears well-developed.   HENT:   Head: Normocephalic and atraumatic.   Nose: Nose normal.   Eyes: Conjunctivae and EOM are normal.   Neck: Normal range of motion. Neck supple.   Cardiovascular: Normal rate, regular rhythm and normal heart " sounds.   Pulmonary/Chest: Effort normal and breath sounds normal.   Musculoskeletal: Normal range of motion.   Back: No midline tenderness, no step off, no crepitus, no signs of abscess or cellulitis or rash.  Tenderness on palpation of left lumbar paraspinal muscle area.   Neurological: She is alert and oriented to person, place, and time.   Skin: Skin is warm and dry.   Psychiatric: She has a normal mood and affect. Her behavior is normal.   Nursing note and vitals reviewed.      Procedures           ED Course                  MDM  Number of Diagnoses or Management Options  Risk of Complications, Morbidity, and/or Mortality  Presenting problems: minimal  Diagnostic procedures: minimal  Management options: low    Patient Progress  Patient progress: stable        Final diagnoses:   None            Valarie Arnold PA-C  12/18/18 1109       Valarie Arnold PA-C  12/18/18 1122       Valarie Arnold PA-C  12/18/18 1123

## 2018-12-18 NOTE — DISCHARGE INSTRUCTIONS
Rest and normal activity with no heavy lifting or straining    Can try apply ice\heat to affected area - 20 minutes on and 40 minutes off while awake    Use medications as directed    Follow-up with primary care provider if no improvement after 5-7 days    Return to ER if worse or new symptoms

## 2019-02-07 ENCOUNTER — OFFICE VISIT (OUTPATIENT)
Dept: FAMILY MEDICINE CLINIC | Facility: CLINIC | Age: 24
End: 2019-02-07

## 2019-02-07 VITALS
HEIGHT: 66 IN | OXYGEN SATURATION: 100 % | DIASTOLIC BLOOD PRESSURE: 70 MMHG | HEART RATE: 105 BPM | SYSTOLIC BLOOD PRESSURE: 96 MMHG | RESPIRATION RATE: 16 BRPM | BODY MASS INDEX: 17.39 KG/M2 | WEIGHT: 108.2 LBS

## 2019-02-07 DIAGNOSIS — F98.8 ADD (ATTENTION DEFICIT DISORDER) WITHOUT HYPERACTIVITY: Primary | ICD-10-CM

## 2019-02-07 PROCEDURE — 99213 OFFICE O/P EST LOW 20 MIN: CPT | Performed by: PHYSICIAN ASSISTANT

## 2019-02-07 NOTE — PROGRESS NOTES
Subjective   Gabriela Epperson is a 24 y.o. female  ADD (RF Adderall 15mg BID)      History of Present Illness  Patient is a pleasant 24-year-old white female who comes in for ADD needs refill of Adderall 15mg bid dispense 60. Good with concentration with focus  The following portions of the patient's history were reviewed and updated as appropriate: allergies, current medications, past social history and problem list    Review of Systems   Constitutional: Negative for appetite change, diaphoresis, fatigue and unexpected weight change.   Eyes: Negative for visual disturbance.   Respiratory: Negative for cough, chest tightness and shortness of breath.    Cardiovascular: Negative for chest pain, palpitations and leg swelling.   Gastrointestinal: Negative for diarrhea, nausea and vomiting.   Endocrine: Negative for polydipsia, polyphagia and polyuria.   Skin: Negative for color change and rash.   Neurological: Negative for dizziness, syncope, weakness, light-headedness, numbness and headaches.       Objective     Vitals:    02/07/19 1307   BP: 96/70   Pulse: 105   Resp: 16   SpO2: 100%       Physical Exam   Constitutional: She appears well-developed and well-nourished.   Neck: Neck supple. No JVD present. No thyromegaly present.   Cardiovascular: Normal rate, regular rhythm, normal heart sounds, intact distal pulses and normal pulses.   No murmur heard.  Pulmonary/Chest: Effort normal and breath sounds normal. No respiratory distress.   Abdominal: Soft. Bowel sounds are normal. There is no hepatosplenomegaly. There is no tenderness.   Musculoskeletal: She exhibits no edema.   Lymphadenopathy:     She has no cervical adenopathy.   Neurological: No sensory deficit.   Skin: Skin is warm and dry. She is not diaphoretic.   Nursing note and vitals reviewed.      Assessment/Plan     Diagnoses and all orders for this visit:    ADD (attention deficit disorder) without hyperactivity    #1 Adderall 15 mg twice day dispense  60    2 prescriptions given    Time spent with patient 1:00 to 1:15 PM, 9 minutes that time he's discussed treatment plan long-term treatment plan discussed ways improved focus and concentration

## 2019-05-07 ENCOUNTER — TELEPHONE (OUTPATIENT)
Dept: INTERNAL MEDICINE | Facility: CLINIC | Age: 24
End: 2019-05-07

## 2019-05-07 NOTE — TELEPHONE ENCOUNTER
Please call patient regarding appointment for tomorrow.  I noticed she is on adderall.  I do not treat ADHD.  If she is needing a refill on that medication she needs to be rescheduled with a provider that does treat that.

## 2019-05-17 ENCOUNTER — OFFICE VISIT (OUTPATIENT)
Dept: INTERNAL MEDICINE | Facility: CLINIC | Age: 24
End: 2019-05-17

## 2019-05-17 VITALS
BODY MASS INDEX: 18 KG/M2 | HEART RATE: 91 BPM | TEMPERATURE: 97.6 F | HEIGHT: 66 IN | RESPIRATION RATE: 16 BRPM | OXYGEN SATURATION: 97 % | WEIGHT: 112 LBS | SYSTOLIC BLOOD PRESSURE: 106 MMHG | DIASTOLIC BLOOD PRESSURE: 70 MMHG

## 2019-05-17 DIAGNOSIS — F98.8 ADD (ATTENTION DEFICIT DISORDER) WITHOUT HYPERACTIVITY: Primary | ICD-10-CM

## 2019-05-17 PROCEDURE — 99213 OFFICE O/P EST LOW 20 MIN: CPT | Performed by: FAMILY MEDICINE

## 2019-05-17 RX ORDER — DEXTROAMPHETAMINE SACCHARATE, AMPHETAMINE ASPARTATE, DEXTROAMPHETAMINE SULFATE AND AMPHETAMINE SULFATE 3.75; 3.75; 3.75; 3.75 MG/1; MG/1; MG/1; MG/1
15 TABLET ORAL 2 TIMES DAILY
Qty: 60 TABLET | Refills: 0 | Status: SHIPPED | OUTPATIENT
Start: 2019-05-17 | End: 2019-06-21 | Stop reason: SDUPTHER

## 2019-05-17 NOTE — PROGRESS NOTES
"Subjective    Gabrielamarino Epperson is a 24 y.o. female here for:  Chief Complaint   Patient presents with   • ADHD     establish care        History of Present Illness   Improved ocncentration, improved anxiety and improved issues with palpitations. Also seems to help with depression. Has taken since 5th grade approx.     The following portions of the patient's history were reviewed and updated as appropriate: allergies, current medications, past family history, past medical history, past social history, past surgical history and problem list.    Review of Systems   Constitutional: Positive for unexpected weight gain.   Allergic/Immunologic: Positive for environmental allergies.   All other systems reviewed and are negative.      Vitals:    05/17/19 0928   BP: 106/70   Pulse: 91   Resp: 16   Temp: 97.6 °F (36.4 °C)   TempSrc: Temporal   SpO2: 97%   Weight: 50.8 kg (112 lb)   Height: 167.6 cm (65.98\")         Objective   Physical Exam   Constitutional: She is oriented to person, place, and time. Vital signs are normal. She appears well-developed and well-nourished. She is active.  Non-toxic appearance. She does not appear ill. No distress.   HENT:   Head: Normocephalic and atraumatic.   Right Ear: Hearing normal.   Left Ear: Hearing normal.   Mouth/Throat: Mucous membranes are not dry.   Eyes: EOM are normal. No scleral icterus.   Neck: Phonation normal. Neck supple.   Cardiovascular: Normal rate and regular rhythm.   Pulmonary/Chest: Effort normal and breath sounds normal.   Neurological: She is alert and oriented to person, place, and time. She displays no tremor. No cranial nerve deficit.   Skin: Skin is warm. No rash noted. She is not diaphoretic. No pallor.   Psychiatric: She has a normal mood and affect. Her speech is normal and behavior is normal. Judgment and thought content normal. Cognition and memory are normal.   Nursing note and vitals reviewed.      3/17/18:  THC, Screen, Urine Negative Negative  "   Phencyclidine (PCP), Urine Negative Negative    Cocaine Screen, Urine Negative Negative    Methamphetamine, Ur Negative Negative    Opiate Screen Negative Negative    Amphetamine Screen, Urine Negative Positive Abnormal     Benzodiazepine Screen, Urine Negative Negative    Tricyclic Antidepressants Screen Negative Negative    Methadone Screen, Urine Negative Negative    Barbiturates Screen, Urine Negative Negative    Oxycodone Screen, Urine Negative Negative    Propoxyphene Screen Negative Negative    Buprenorphine, Screen, Urine Negative Negative          Assessment/Plan   Problem List Items Addressed This Visit        Other    ADD (attention deficit disorder) without hyperactivity - Primary    Relevant Medications    amphetamine-dextroamphetamine (ADDERALL) 15 MG tablet    Other Relevant Orders    Compliance Drug Analysis, Ur - Urine, Clean Catch          CONTROLLED SUBSTANCE TRACKING 5/17/2019   Last Tiffanie 5/17/2019   Report Number [id number]   Last UDS 5/17/2019   Last Controlled Substance Agreement 5/17/2019   Prior UDT result Expected       · TIFFANIE reviewed and appropriate.   · Reviewed previous UDS in chart from past [facility] provider  · May contact office for next two rx with 48 hrs notice, return to clinic 3 months.    Return in about 3 months (around 8/17/2019).    Yanelis Braden MD

## 2019-05-24 LAB — DRUGS UR: NORMAL

## 2019-06-21 DIAGNOSIS — F98.8 ADD (ATTENTION DEFICIT DISORDER) WITHOUT HYPERACTIVITY: ICD-10-CM

## 2019-06-24 DIAGNOSIS — F98.8 ADD (ATTENTION DEFICIT DISORDER) WITHOUT HYPERACTIVITY: ICD-10-CM

## 2019-06-25 RX ORDER — DEXTROAMPHETAMINE SACCHARATE, AMPHETAMINE ASPARTATE, DEXTROAMPHETAMINE SULFATE AND AMPHETAMINE SULFATE 3.75; 3.75; 3.75; 3.75 MG/1; MG/1; MG/1; MG/1
15 TABLET ORAL 2 TIMES DAILY
Qty: 60 TABLET | Refills: 0 | Status: SHIPPED | OUTPATIENT
Start: 2019-06-25 | End: 2019-07-25 | Stop reason: SDUPTHER

## 2019-06-25 RX ORDER — DEXTROAMPHETAMINE SACCHARATE, AMPHETAMINE ASPARTATE, DEXTROAMPHETAMINE SULFATE AND AMPHETAMINE SULFATE 3.75; 3.75; 3.75; 3.75 MG/1; MG/1; MG/1; MG/1
TABLET ORAL
Qty: 60 TABLET | Refills: 0 | OUTPATIENT
Start: 2019-06-25

## 2019-07-25 DIAGNOSIS — F98.8 ADD (ATTENTION DEFICIT DISORDER) WITHOUT HYPERACTIVITY: ICD-10-CM

## 2019-07-26 RX ORDER — DEXTROAMPHETAMINE SACCHARATE, AMPHETAMINE ASPARTATE, DEXTROAMPHETAMINE SULFATE AND AMPHETAMINE SULFATE 3.75; 3.75; 3.75; 3.75 MG/1; MG/1; MG/1; MG/1
15 TABLET ORAL 2 TIMES DAILY
Qty: 60 TABLET | Refills: 0 | Status: SHIPPED | OUTPATIENT
Start: 2019-07-26 | End: 2019-09-16 | Stop reason: SDUPTHER

## 2019-09-16 ENCOUNTER — OFFICE VISIT (OUTPATIENT)
Dept: INTERNAL MEDICINE | Facility: CLINIC | Age: 24
End: 2019-09-16

## 2019-09-16 VITALS
SYSTOLIC BLOOD PRESSURE: 94 MMHG | OXYGEN SATURATION: 99 % | HEART RATE: 77 BPM | WEIGHT: 116.75 LBS | DIASTOLIC BLOOD PRESSURE: 64 MMHG | RESPIRATION RATE: 16 BRPM | HEIGHT: 66 IN | TEMPERATURE: 97.6 F | BODY MASS INDEX: 18.76 KG/M2

## 2019-09-16 DIAGNOSIS — Z28.21 INFLUENZA VACCINATION DECLINED: ICD-10-CM

## 2019-09-16 DIAGNOSIS — F98.8 ADD (ATTENTION DEFICIT DISORDER) WITHOUT HYPERACTIVITY: Primary | ICD-10-CM

## 2019-09-16 PROCEDURE — 99213 OFFICE O/P EST LOW 20 MIN: CPT | Performed by: FAMILY MEDICINE

## 2019-09-16 RX ORDER — DEXTROAMPHETAMINE SACCHARATE, AMPHETAMINE ASPARTATE, DEXTROAMPHETAMINE SULFATE AND AMPHETAMINE SULFATE 3.75; 3.75; 3.75; 3.75 MG/1; MG/1; MG/1; MG/1
15 TABLET ORAL 2 TIMES DAILY
Qty: 60 TABLET | Refills: 0 | Status: SHIPPED | OUTPATIENT
Start: 2019-09-16 | End: 2019-10-16 | Stop reason: SDUPTHER

## 2019-09-16 NOTE — PROGRESS NOTES
"Subjective    Gabriela Epperson is a 24 y.o. female here for:  Chief Complaint   Patient presents with   • ADHD     f/u on medication pt states she is doing well      History of Present Illness     Gabriela Epperson is here for ADHD follow up. Patient is doing well, no complaints. The medicine continues to help with focus and concentration. No chest pain, palpitations, sleep disturbance. Appetite is within normal limits. Gabriela Epperson  wishes to remain on therapy. Medicine continues to show benefit.      The following portions of the patient's history were reviewed and updated as appropriate: allergies, current medications, past family history, past medical history, past social history, past surgical history and problem list.    Review of Systems   Constitutional: Negative for fatigue.   Respiratory: Negative for shortness of breath.    Cardiovascular: Negative for chest pain and palpitations.   Psychiatric/Behavioral: Negative for agitation and sleep disturbance.       Vitals:    09/16/19 1306   BP: 94/64   Pulse: 77   Resp: 16   Temp: 97.6 °F (36.4 °C)   TempSrc: Temporal   SpO2: 99%   Weight: 53 kg (116 lb 12 oz)   Height: 167.6 cm (65.98\")         Objective   Physical Exam   Constitutional: She is oriented to person, place, and time. Vital signs are normal. She appears well-developed and well-nourished. She is active.  Non-toxic appearance. She does not appear ill. No distress.   HENT:   Head: Normocephalic and atraumatic.   Right Ear: Hearing normal.   Left Ear: Hearing normal.   Mouth/Throat: Mucous membranes are not dry.   Eyes: EOM are normal. No scleral icterus.   Neck: Phonation normal. Neck supple.   Cardiovascular: Normal rate and regular rhythm.   Pulmonary/Chest: Effort normal and breath sounds normal.   Neurological: She is alert and oriented to person, place, and time. She displays no tremor. No cranial nerve deficit.   Skin: Skin is warm. No rash noted. She is not diaphoretic. No pallor. "   Psychiatric: She has a normal mood and affect. Her speech is normal and behavior is normal. Judgment and thought content normal. Cognition and memory are normal.   Nursing note and vitals reviewed.        Assessment/Plan     Problem List Items Addressed This Visit        Other    ADD (attention deficit disorder) without hyperactivity - Primary    Relevant Medications    amphetamine-dextroamphetamine (ADDERALL) 15 MG tablet      Other Visit Diagnoses     Influenza vaccination declined              CONTROLLED SUBSTANCE TRACKING 5/17/2019 9/16/2019   Last Tiffanie 5/17/2019 9/16/2019   Report Number [id number]   Last UDS 5/17/2019 5/17/2019   Last Controlled Substance Agreement 5/17/2019 5/17/2019   Prior UDT result Expected -     · TIFFANIE reviewed and appropriate.   · Gabriela Epperson continues to show benefit with use.  No red flags.  May call for next two prescriptions. Instructed to call at least 48 hours in advance.    Return in about 3 months (around 12/16/2019) for ADHD follow up.    Yanelis Braden MD

## 2019-10-16 DIAGNOSIS — F98.8 ADD (ATTENTION DEFICIT DISORDER) WITHOUT HYPERACTIVITY: ICD-10-CM

## 2019-10-16 RX ORDER — DEXTROAMPHETAMINE SACCHARATE, AMPHETAMINE ASPARTATE, DEXTROAMPHETAMINE SULFATE AND AMPHETAMINE SULFATE 3.75; 3.75; 3.75; 3.75 MG/1; MG/1; MG/1; MG/1
15 TABLET ORAL 2 TIMES DAILY
Qty: 60 TABLET | Refills: 0 | Status: ON HOLD | OUTPATIENT
Start: 2019-10-16 | End: 2022-05-05

## 2020-02-17 ENCOUNTER — OFFICE VISIT (OUTPATIENT)
Dept: FAMILY MEDICINE CLINIC | Facility: CLINIC | Age: 25
End: 2020-02-17

## 2020-02-17 VITALS
HEART RATE: 85 BPM | DIASTOLIC BLOOD PRESSURE: 70 MMHG | SYSTOLIC BLOOD PRESSURE: 102 MMHG | OXYGEN SATURATION: 99 % | HEIGHT: 66 IN | BODY MASS INDEX: 19.29 KG/M2 | WEIGHT: 120 LBS

## 2020-02-17 DIAGNOSIS — R00.2 PALPITATIONS: ICD-10-CM

## 2020-02-17 DIAGNOSIS — J32.0 MAXILLARY SINUSITIS, UNSPECIFIED CHRONICITY: ICD-10-CM

## 2020-02-17 DIAGNOSIS — F39 MOOD DISORDER (HCC): ICD-10-CM

## 2020-02-17 DIAGNOSIS — F98.8 ADD (ATTENTION DEFICIT DISORDER) WITHOUT HYPERACTIVITY: Primary | ICD-10-CM

## 2020-02-17 DIAGNOSIS — F17.200 TOBACCO DEPENDENCE: ICD-10-CM

## 2020-02-17 PROCEDURE — 99204 OFFICE O/P NEW MOD 45 MIN: CPT | Performed by: PHYSICIAN ASSISTANT

## 2020-02-17 RX ORDER — BUPROPION HYDROCHLORIDE 150 MG/1
TABLET, EXTENDED RELEASE ORAL
Qty: 60 TABLET | Refills: 1 | Status: ON HOLD | OUTPATIENT
Start: 2020-02-17 | End: 2022-05-22

## 2020-02-17 RX ORDER — SACCHAROMYCES BOULARDII 250 MG
250 CAPSULE ORAL 2 TIMES DAILY
Qty: 20 CAPSULE | Refills: 0 | Status: ON HOLD | OUTPATIENT
Start: 2020-02-17 | End: 2022-05-22

## 2020-02-17 RX ORDER — AMOXICILLIN 875 MG/1
TABLET, COATED ORAL
Qty: 20 TABLET | Refills: 0 | Status: ON HOLD | OUTPATIENT
Start: 2020-02-17 | End: 2022-05-05

## 2020-03-08 PROBLEM — F39 MOOD DISORDER: Status: ACTIVE | Noted: 2020-03-08

## 2021-11-23 ENCOUNTER — LAB (OUTPATIENT)
Dept: LAB | Facility: HOSPITAL | Age: 26
End: 2021-11-23

## 2021-11-23 ENCOUNTER — TRANSCRIBE ORDERS (OUTPATIENT)
Dept: LAB | Facility: HOSPITAL | Age: 26
End: 2021-11-23

## 2021-11-23 DIAGNOSIS — Z34.81 PRENATAL CARE, SUBSEQUENT PREGNANCY, FIRST TRIMESTER: Primary | ICD-10-CM

## 2021-11-23 LAB
ABO GROUP BLD: NORMAL
AMPHET+METHAMPHET UR QL: NEGATIVE
AMPHETAMINES UR QL: NEGATIVE
BACTERIA UR QL AUTO: ABNORMAL /HPF
BARBITURATES UR QL SCN: NEGATIVE
BENZODIAZ UR QL SCN: NEGATIVE
BILIRUB UR QL STRIP: NEGATIVE
BLD GP AB SCN SERPL QL: NEGATIVE
BUPRENORPHINE SERPL-MCNC: NEGATIVE NG/ML
CANNABINOIDS SERPL QL: NEGATIVE
CLARITY UR: ABNORMAL
COCAINE UR QL: NEGATIVE
COLOR UR: ABNORMAL
GLUCOSE UR STRIP-MCNC: NEGATIVE MG/DL
HGB UR QL STRIP.AUTO: NEGATIVE
HYALINE CASTS UR QL AUTO: ABNORMAL /LPF
KETONES UR QL STRIP: ABNORMAL
LEUKOCYTE ESTERASE UR QL STRIP.AUTO: ABNORMAL
METHADONE UR QL SCN: NEGATIVE
NITRITE UR QL STRIP: NEGATIVE
OPIATES UR QL: NEGATIVE
OXYCODONE UR QL SCN: NEGATIVE
PCP UR QL SCN: NEGATIVE
PH UR STRIP.AUTO: 5.5 [PH] (ref 5–8)
PROPOXYPH UR QL: NEGATIVE
PROT UR QL STRIP: NEGATIVE
RBC # UR STRIP: ABNORMAL /HPF
REF LAB TEST METHOD: ABNORMAL
RH BLD: POSITIVE
SP GR UR STRIP: >=1.03 (ref 1–1.03)
SQUAMOUS #/AREA URNS HPF: ABNORMAL /HPF
TRICYCLICS UR QL SCN: NEGATIVE
UROBILINOGEN UR QL STRIP: ABNORMAL
WBC # UR STRIP: ABNORMAL /HPF

## 2021-11-23 PROCEDURE — 36415 COLL VENOUS BLD VENIPUNCTURE: CPT | Performed by: OBSTETRICS & GYNECOLOGY

## 2021-11-23 PROCEDURE — 80306 DRUG TEST PRSMV INSTRMNT: CPT | Performed by: OBSTETRICS & GYNECOLOGY

## 2021-11-23 PROCEDURE — 81001 URINALYSIS AUTO W/SCOPE: CPT | Performed by: OBSTETRICS & GYNECOLOGY

## 2021-11-23 PROCEDURE — 80081 OBSTETRIC PANEL INC HIV TSTG: CPT | Performed by: OBSTETRICS & GYNECOLOGY

## 2021-11-23 PROCEDURE — 86803 HEPATITIS C AB TEST: CPT | Performed by: OBSTETRICS & GYNECOLOGY

## 2021-11-23 PROCEDURE — 82947 ASSAY GLUCOSE BLOOD QUANT: CPT | Performed by: OBSTETRICS & GYNECOLOGY

## 2021-11-23 PROCEDURE — 87086 URINE CULTURE/COLONY COUNT: CPT | Performed by: OBSTETRICS & GYNECOLOGY

## 2021-11-24 LAB
BACTERIA SPEC AEROBE CULT: NO GROWTH
BASOPHILS # BLD AUTO: 0.04 10*3/MM3 (ref 0–0.2)
BASOPHILS NFR BLD AUTO: 0.6 % (ref 0–1.5)
DEPRECATED RDW RBC AUTO: 40 FL (ref 37–54)
EOSINOPHIL # BLD AUTO: 0.04 10*3/MM3 (ref 0–0.4)
EOSINOPHIL NFR BLD AUTO: 0.6 % (ref 0.3–6.2)
ERYTHROCYTE [DISTWIDTH] IN BLOOD BY AUTOMATED COUNT: 11.9 % (ref 12.3–15.4)
GLUCOSE SERPL-MCNC: 82 MG/DL (ref 65–99)
HBV SURFACE AG SERPL QL IA: NORMAL
HCT VFR BLD AUTO: 37.7 % (ref 34–46.6)
HCV AB SER DONR QL: NORMAL
HGB BLD-MCNC: 13 G/DL (ref 12–15.9)
HIV1+2 AB SER QL: NORMAL
IMM GRANULOCYTES # BLD AUTO: 0.02 10*3/MM3 (ref 0–0.05)
IMM GRANULOCYTES NFR BLD AUTO: 0.3 % (ref 0–0.5)
LYMPHOCYTES # BLD AUTO: 1.51 10*3/MM3 (ref 0.7–3.1)
LYMPHOCYTES NFR BLD AUTO: 20.9 % (ref 19.6–45.3)
MCH RBC QN AUTO: 31.6 PG (ref 26.6–33)
MCHC RBC AUTO-ENTMCNC: 34.5 G/DL (ref 31.5–35.7)
MCV RBC AUTO: 91.7 FL (ref 79–97)
MONOCYTES # BLD AUTO: 0.43 10*3/MM3 (ref 0.1–0.9)
MONOCYTES NFR BLD AUTO: 5.9 % (ref 5–12)
NEUTROPHILS NFR BLD AUTO: 5.19 10*3/MM3 (ref 1.7–7)
NEUTROPHILS NFR BLD AUTO: 71.7 % (ref 42.7–76)
NRBC BLD AUTO-RTO: 0 /100 WBC (ref 0–0.2)
PLATELET # BLD AUTO: 179 10*3/MM3 (ref 140–450)
PMV BLD AUTO: 12.1 FL (ref 6–12)
RBC # BLD AUTO: 4.11 10*6/MM3 (ref 3.77–5.28)
RPR SER QL: NORMAL
WBC NRBC COR # BLD: 7.23 10*3/MM3 (ref 3.4–10.8)

## 2021-11-25 LAB — RUBV IGG SERPL IA-ACNC: 8.29 INDEX

## 2022-03-15 LAB — EXTERNAL GLUCOSE TOLERANCE TEST FASTING: 82 MG/DL

## 2022-03-18 ENCOUNTER — LAB (OUTPATIENT)
Dept: LAB | Facility: HOSPITAL | Age: 27
End: 2022-03-18

## 2022-03-18 ENCOUNTER — TRANSCRIBE ORDERS (OUTPATIENT)
Dept: LAB | Facility: HOSPITAL | Age: 27
End: 2022-03-18

## 2022-03-18 DIAGNOSIS — Z3A.28 28 WEEKS GESTATION OF PREGNANCY: ICD-10-CM

## 2022-03-18 DIAGNOSIS — Z3A.28 28 WEEKS GESTATION OF PREGNANCY: Primary | ICD-10-CM

## 2022-03-18 LAB
BLD GP AB SCN SERPL QL: NEGATIVE
DEPRECATED RDW RBC AUTO: 42 FL (ref 37–54)
ERYTHROCYTE [DISTWIDTH] IN BLOOD BY AUTOMATED COUNT: 12.2 % (ref 12.3–15.4)
GLUCOSE 1H P 100 G GLC PO SERPL-MCNC: 122 MG/DL (ref 65–139)
HCT VFR BLD AUTO: 35.7 % (ref 34–46.6)
HGB BLD-MCNC: 11.9 G/DL (ref 12–15.9)
MCH RBC QN AUTO: 31.6 PG (ref 26.6–33)
MCHC RBC AUTO-ENTMCNC: 33.3 G/DL (ref 31.5–35.7)
MCV RBC AUTO: 94.9 FL (ref 79–97)
PLATELET # BLD AUTO: 165 10*3/MM3 (ref 140–450)
PMV BLD AUTO: 12 FL (ref 6–12)
RBC # BLD AUTO: 3.76 10*6/MM3 (ref 3.77–5.28)
WBC NRBC COR # BLD: 9.95 10*3/MM3 (ref 3.4–10.8)

## 2022-03-18 PROCEDURE — 36415 COLL VENOUS BLD VENIPUNCTURE: CPT

## 2022-03-18 PROCEDURE — 86850 RBC ANTIBODY SCREEN: CPT

## 2022-03-18 PROCEDURE — 85027 COMPLETE CBC AUTOMATED: CPT

## 2022-03-18 PROCEDURE — 82950 GLUCOSE TEST: CPT

## 2022-05-05 ENCOUNTER — HOSPITAL ENCOUNTER (OUTPATIENT)
Facility: HOSPITAL | Age: 27
End: 2022-05-05
Attending: OBSTETRICS & GYNECOLOGY | Admitting: OBSTETRICS & GYNECOLOGY

## 2022-05-05 ENCOUNTER — HOSPITAL ENCOUNTER (OUTPATIENT)
Facility: HOSPITAL | Age: 27
Discharge: HOME | End: 2022-05-05
Attending: OBSTETRICS & GYNECOLOGY | Admitting: OBSTETRICS & GYNECOLOGY

## 2022-05-05 VITALS
SYSTOLIC BLOOD PRESSURE: 103 MMHG | WEIGHT: 144 LBS | RESPIRATION RATE: 16 BRPM | DIASTOLIC BLOOD PRESSURE: 69 MMHG | OXYGEN SATURATION: 98 % | BODY MASS INDEX: 23.14 KG/M2 | HEIGHT: 66 IN | TEMPERATURE: 98 F | HEART RATE: 93 BPM

## 2022-05-05 PROCEDURE — 63710000001 ONDANSETRON PER 8 MG: Performed by: OBSTETRICS & GYNECOLOGY

## 2022-05-05 PROCEDURE — 99202 OFFICE O/P NEW SF 15 MIN: CPT | Performed by: OBSTETRICS & GYNECOLOGY

## 2022-05-05 PROCEDURE — 59025 FETAL NON-STRESS TEST: CPT | Performed by: OBSTETRICS & GYNECOLOGY

## 2022-05-05 PROCEDURE — G0463 HOSPITAL OUTPT CLINIC VISIT: HCPCS

## 2022-05-05 RX ORDER — ONDANSETRON 4 MG/1
4 TABLET, FILM COATED ORAL ONCE AS NEEDED
Status: COMPLETED | OUTPATIENT
Start: 2022-05-05 | End: 2022-05-05

## 2022-05-05 RX ORDER — ACETAMINOPHEN 500 MG
1000 TABLET ORAL EVERY 6 HOURS PRN
Status: ON HOLD | COMMUNITY
End: 2022-05-22

## 2022-05-05 RX ORDER — PRENATAL WITH FERROUS FUM AND FOLIC ACID 3080; 920; 120; 400; 22; 1.84; 3; 20; 10; 1; 12; 200; 27; 25; 2 [IU]/1; [IU]/1; MG/1; [IU]/1; MG/1; MG/1; MG/1; MG/1; MG/1; MG/1; UG/1; MG/1; MG/1; MG/1; MG/1
1 TABLET ORAL DAILY
COMMUNITY

## 2022-05-05 RX ADMIN — ONDANSETRON HYDROCHLORIDE 4 MG: 4 TABLET, FILM COATED ORAL at 17:06
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
## 2022-05-05 NOTE — DISCHARGE INSTR - DIET
Avoid spicy, greasy food. Stick to bland, low fat foods. Rice, applesauce, banana, toast, mashed potato (not a lot of butter) while you are not feeling well.

## 2022-05-10 LAB — EXTERNAL GROUP B STREP ANTIGEN: NORMAL

## 2022-05-22 ENCOUNTER — HOSPITAL ENCOUNTER (OUTPATIENT)
Facility: HOSPITAL | Age: 27
Setting detail: OBSERVATION
Discharge: HOME | End: 2022-05-22
Attending: NURSE PRACTITIONER | Admitting: OBSTETRICS & GYNECOLOGY

## 2022-05-22 VITALS
TEMPERATURE: 98.7 F | SYSTOLIC BLOOD PRESSURE: 114 MMHG | BODY MASS INDEX: 22.98 KG/M2 | HEIGHT: 66 IN | WEIGHT: 143 LBS | HEART RATE: 90 BPM | RESPIRATION RATE: 16 BRPM | DIASTOLIC BLOOD PRESSURE: 72 MMHG

## 2022-05-22 PROBLEM — O47.1 FALSE LABOR AFTER 37 WEEKS OF GESTATION WITHOUT DELIVERY: Status: ACTIVE | Noted: 2022-05-22

## 2022-05-22 PROCEDURE — G0463 HOSPITAL OUTPT CLINIC VISIT: HCPCS

## 2022-05-22 PROCEDURE — 59025 FETAL NON-STRESS TEST: CPT | Performed by: OBSTETRICS & GYNECOLOGY

## 2022-05-22 PROCEDURE — 59025 FETAL NON-STRESS TEST: CPT

## 2022-05-22 PROCEDURE — 99219 PR INITIAL OBSERVATION CARE/DAY 50 MINUTES: CPT | Performed by: OBSTETRICS & GYNECOLOGY

## 2022-05-22 PROCEDURE — G0378 HOSPITAL OBSERVATION PER HR: HCPCS

## 2022-05-22 NOTE — NURSING NOTE
D/C instructions reviewed with pt. Pt SHAUNA and has no questions. Ambulated off unit in stable condition. Denies need for wheelchair ride. Has all belongings

## 2022-05-22 NOTE — H&P
"Gabriela Epperson  1995  [id number]  [id number]    CC: contractions  HPI:  Patient is 27 y.o. female   currently at 37w6d presents with c/o uterine contractions, onset ~midnight, intermittent, rates 3-4/10, non-radiating, denies assoc vag bleeding or leaking.  Good FM. PNC comp by tob use     PMH:  Current meds: PNV  Illnesses: migraines, heart palpitations, anxiety/depression, PTSD, bipolar d/o  Surgeries: oral surg  Allergies: NKDA    Past OB History:       OB History    Para Term  AB Living   4 1 1 0 2 1   SAB IAB Ectopic Molar Multiple Live Births   2 0 0 0 0 1      # Outcome Date GA Lbr Kirt/2nd Weight Sex Delivery Anes PTL Lv   4 Current            3 SAB 21     Spon misscar      2 Term 05/13/15 37w0d  2920 g (6 lb 7 oz) M Vag-Spont EPI Y JAMES      Name: Carlos   1 SAB 09     SAB               SH: tob 1cig/d , EtOH neg, drugs neg  FH: heart dz pos , diabetes pos , cancer pos    General ROS: contractions.   All other systems reviewed and are negative.      Physical Examination: General appearance - alert, well appearing, and in no distress  Vital signs - /72   Pulse 90   Temp 98.7 °F (37.1 °C)   Resp 16   Ht 167.6 cm (66\")   Wt 64.9 kg (143 lb)   LMP 2020   BMI 23.08 kg/m²   HEENT: normocephalic, atraumatic,oropharynx clear, appearance of ears and nose normal  Neck - supple, no significant adenopathy, no thyromegaly  Lymphatics - no palpable lymphadenopathy in the neck or groin, no hepatosplenomegaly  Chest - clear to auscultation, no wheezes, rales or rhonchi, respiratory effort non-labored  Heart - normal rate, regular rhythm, no murmurs, rubs, clicks or gallops, no JVD, no lower extremity edema  Abdomen - soft, nontender, nondistended, no masses, no hepatosplenomegaly  no rebound tenderness noted, bowel sounds normal  Vaginal Exam: 1-2/70%/-2, no blood in vault ,external genitalia normal  Extremities - no pedal edema noted, no calf tend  Skin -warm and " dry, normal coloration and turgor, no rashes, no suspicious skin lesions noted    Fetal monitoring: indication contractions , onset 0322 , offset 0430 , baseline 130 , mod BTB variability , multiple accels (15 X 15), no decels, irreg contractions, interpretation reactive NST    Radiology     Assessment 1)IUP 37 6/7 weeks   2)false labor   3)tob use    Plan 1)observe   2)home   3)keep next sched appt    Eber Carrizales MD  5/22/2022  04:42 EDT

## 2022-05-29 ENCOUNTER — ANESTHESIA EVENT (OUTPATIENT)
Dept: LABOR AND DELIVERY | Facility: HOSPITAL | Age: 27
End: 2022-05-29

## 2022-05-29 ENCOUNTER — ANESTHESIA (OUTPATIENT)
Dept: LABOR AND DELIVERY | Facility: HOSPITAL | Age: 27
End: 2022-05-29

## 2022-05-29 ENCOUNTER — HOSPITAL ENCOUNTER (INPATIENT)
Facility: HOSPITAL | Age: 27
LOS: 2 days | Discharge: HOME | End: 2022-06-01
Attending: NURSE PRACTITIONER | Admitting: OBSTETRICS & GYNECOLOGY

## 2022-05-29 LAB
DEPRECATED RDW RBC AUTO: 42.5 FL (ref 37–54)
ERYTHROCYTE [DISTWIDTH] IN BLOOD BY AUTOMATED COUNT: 13 % (ref 12.3–15.4)
HCT VFR BLD AUTO: 32.9 % (ref 34–46.6)
HGB BLD-MCNC: 11.3 G/DL (ref 12–15.9)
MCH RBC QN AUTO: 31 PG (ref 26.6–33)
MCHC RBC AUTO-ENTMCNC: 34.3 G/DL (ref 31.5–35.7)
MCV RBC AUTO: 90.1 FL (ref 79–97)
PLATELET # BLD AUTO: 179 10*3/MM3 (ref 140–450)
PMV BLD AUTO: 11.7 FL (ref 6–12)
RBC # BLD AUTO: 3.65 10*6/MM3 (ref 3.77–5.28)
WBC NRBC COR # BLD: 14.24 10*3/MM3 (ref 3.4–10.8)

## 2022-05-29 PROCEDURE — 86900 BLOOD TYPING SEROLOGIC ABO: CPT | Performed by: NURSE PRACTITIONER

## 2022-05-29 PROCEDURE — 86850 RBC ANTIBODY SCREEN: CPT | Performed by: NURSE PRACTITIONER

## 2022-05-29 PROCEDURE — 36415 COLL VENOUS BLD VENIPUNCTURE: CPT | Performed by: NURSE PRACTITIONER

## 2022-05-29 PROCEDURE — 86901 BLOOD TYPING SEROLOGIC RH(D): CPT | Performed by: NURSE PRACTITIONER

## 2022-05-29 PROCEDURE — 87635 SARS-COV-2 COVID-19 AMP PRB: CPT | Performed by: NURSE PRACTITIONER

## 2022-05-29 PROCEDURE — 85027 COMPLETE CBC AUTOMATED: CPT | Performed by: NURSE PRACTITIONER

## 2022-05-29 RX ORDER — EPHEDRINE SULFATE 5 MG/ML
INJECTION INTRAVENOUS
Status: COMPLETED
Start: 2022-05-29 | End: 2022-05-30

## 2022-05-29 RX ORDER — DIPHENHYDRAMINE HYDROCHLORIDE 50 MG/ML
12.5 INJECTION INTRAMUSCULAR; INTRAVENOUS EVERY 8 HOURS PRN
Status: DISCONTINUED | OUTPATIENT
Start: 2022-05-29 | End: 2022-05-30

## 2022-05-29 RX ORDER — MAGNESIUM CARB/ALUMINUM HYDROX 105-160MG
TABLET,CHEWABLE ORAL
Status: DISCONTINUED
Start: 2022-05-29 | End: 2022-06-01 | Stop reason: HOSPADM

## 2022-05-29 RX ORDER — EPHEDRINE SULFATE 5 MG/ML
10 INJECTION INTRAVENOUS
Status: DISCONTINUED | OUTPATIENT
Start: 2022-05-29 | End: 2022-05-30

## 2022-05-29 RX ORDER — TRISODIUM CITRATE DIHYDRATE AND CITRIC ACID MONOHYDRATE 500; 334 MG/5ML; MG/5ML
30 SOLUTION ORAL ONCE
Status: DISCONTINUED | OUTPATIENT
Start: 2022-05-30 | End: 2022-05-30

## 2022-05-29 RX ORDER — FAMOTIDINE 10 MG/ML
20 INJECTION, SOLUTION INTRAVENOUS ONCE AS NEEDED
Status: DISCONTINUED | OUTPATIENT
Start: 2022-05-29 | End: 2022-05-30

## 2022-05-29 RX ORDER — ONDANSETRON 2 MG/ML
4 INJECTION INTRAMUSCULAR; INTRAVENOUS ONCE AS NEEDED
Status: DISCONTINUED | OUTPATIENT
Start: 2022-05-29 | End: 2022-05-30

## 2022-05-29 RX ORDER — METOCLOPRAMIDE HYDROCHLORIDE 5 MG/ML
10 INJECTION INTRAMUSCULAR; INTRAVENOUS ONCE AS NEEDED
Status: DISCONTINUED | OUTPATIENT
Start: 2022-05-29 | End: 2022-05-30

## 2022-05-29 RX ORDER — ROPIVACAINE HYDROCHLORIDE 2 MG/ML
15 INJECTION, SOLUTION EPIDURAL; INFILTRATION; PERINEURAL CONTINUOUS
Status: DISCONTINUED | OUTPATIENT
Start: 2022-05-30 | End: 2022-05-30

## 2022-05-29 RX ORDER — ERYTHROMYCIN 5 MG/G
OINTMENT OPHTHALMIC
Status: DISCONTINUED
Start: 2022-05-29 | End: 2022-06-01 | Stop reason: HOSPADM

## 2022-05-29 RX ADMIN — SODIUM CHLORIDE, POTASSIUM CHLORIDE, SODIUM LACTATE AND CALCIUM CHLORIDE 1000 ML: 600; 310; 30; 20 INJECTION, SOLUTION INTRAVENOUS at 23:15

## 2022-05-30 PROBLEM — O47.1 FALSE LABOR AFTER 37 WEEKS OF GESTATION WITHOUT DELIVERY: Status: RESOLVED | Noted: 2022-05-22 | Resolved: 2022-05-30

## 2022-05-30 PROBLEM — Z37.9 NORMAL LABOR: Status: RESOLVED | Noted: 2022-05-30 | Resolved: 2022-05-30

## 2022-05-30 PROBLEM — Z37.9 NORMAL LABOR: Status: ACTIVE | Noted: 2022-05-30

## 2022-05-30 LAB
ABO GROUP BLD: NORMAL
BLD GP AB SCN SERPL QL: NEGATIVE
RH BLD: POSITIVE
SARS-COV-2 RDRP RESP QL NAA+PROBE: NORMAL
T&S EXPIRATION DATE: NORMAL

## 2022-05-30 PROCEDURE — 59025 FETAL NON-STRESS TEST: CPT

## 2022-05-30 PROCEDURE — 25010000002 FENTANYL CITRATE (PF) 50 MCG/ML SOLUTION: Performed by: ANESTHESIOLOGY

## 2022-05-30 PROCEDURE — 25010000002 ROPIVACAINE PER 1 MG: Performed by: ANESTHESIOLOGY

## 2022-05-30 PROCEDURE — 51702 INSERT TEMP BLADDER CATH: CPT

## 2022-05-30 PROCEDURE — 4A1HX4Z MONITORING OF PRODUCTS OF CONCEPTION, CARDIAC ELECTRICAL ACTIVITY, EXTERNAL APPROACH: ICD-10-PCS | Performed by: ADVANCED PRACTICE MIDWIFE

## 2022-05-30 PROCEDURE — C1755 CATHETER, INTRASPINAL: HCPCS | Performed by: ANESTHESIOLOGY

## 2022-05-30 PROCEDURE — C1755 CATHETER, INTRASPINAL: HCPCS

## 2022-05-30 PROCEDURE — 25010000002 ROPIVACAINE PER 1 MG: Performed by: NURSE PRACTITIONER

## 2022-05-30 RX ORDER — OXYTOCIN/0.9 % SODIUM CHLORIDE 30/500 ML
650 PLASTIC BAG, INJECTION (ML) INTRAVENOUS ONCE
Status: DISCONTINUED | OUTPATIENT
Start: 2022-05-30 | End: 2022-05-30 | Stop reason: HOSPADM

## 2022-05-30 RX ORDER — ONDANSETRON 2 MG/ML
4 INJECTION INTRAMUSCULAR; INTRAVENOUS EVERY 6 HOURS PRN
Status: DISCONTINUED | OUTPATIENT
Start: 2022-05-30 | End: 2022-06-01 | Stop reason: HOSPADM

## 2022-05-30 RX ORDER — MISOPROSTOL 200 UG/1
800 TABLET ORAL AS NEEDED
Status: DISCONTINUED | OUTPATIENT
Start: 2022-05-30 | End: 2022-05-30 | Stop reason: HOSPADM

## 2022-05-30 RX ORDER — FENTANYL CITRATE 50 UG/ML
INJECTION, SOLUTION INTRAMUSCULAR; INTRAVENOUS AS NEEDED
Status: DISCONTINUED | OUTPATIENT
Start: 2022-05-30 | End: 2022-05-30 | Stop reason: SURG

## 2022-05-30 RX ORDER — IBUPROFEN 600 MG/1
600 TABLET ORAL EVERY 6 HOURS PRN
Status: DISCONTINUED | OUTPATIENT
Start: 2022-05-30 | End: 2022-06-01 | Stop reason: HOSPADM

## 2022-05-30 RX ORDER — OXYTOCIN/0.9 % SODIUM CHLORIDE 30/500 ML
85 PLASTIC BAG, INJECTION (ML) INTRAVENOUS ONCE
Status: COMPLETED | OUTPATIENT
Start: 2022-05-30 | End: 2022-05-30

## 2022-05-30 RX ORDER — ROPIVACAINE HYDROCHLORIDE 5 MG/ML
INJECTION, SOLUTION EPIDURAL; INFILTRATION; PERINEURAL AS NEEDED
Status: DISCONTINUED | OUTPATIENT
Start: 2022-05-30 | End: 2022-05-30 | Stop reason: SURG

## 2022-05-30 RX ORDER — OXYTOCIN/0.9 % SODIUM CHLORIDE 30/500 ML
2 PLASTIC BAG, INJECTION (ML) INTRAVENOUS
Status: DISCONTINUED | OUTPATIENT
Start: 2022-05-30 | End: 2022-05-30

## 2022-05-30 RX ORDER — PROMETHAZINE HYDROCHLORIDE 12.5 MG/1
12.5 TABLET ORAL EVERY 6 HOURS PRN
Status: DISCONTINUED | OUTPATIENT
Start: 2022-05-30 | End: 2022-05-30 | Stop reason: HOSPADM

## 2022-05-30 RX ORDER — ONDANSETRON 4 MG/1
4 TABLET, FILM COATED ORAL EVERY 6 HOURS PRN
Status: DISCONTINUED | OUTPATIENT
Start: 2022-05-30 | End: 2022-05-30 | Stop reason: HOSPADM

## 2022-05-30 RX ORDER — SODIUM CHLORIDE, SODIUM LACTATE, POTASSIUM CHLORIDE, CALCIUM CHLORIDE 600; 310; 30; 20 MG/100ML; MG/100ML; MG/100ML; MG/100ML
125 INJECTION, SOLUTION INTRAVENOUS CONTINUOUS
Status: DISCONTINUED | OUTPATIENT
Start: 2022-05-30 | End: 2022-05-30

## 2022-05-30 RX ORDER — PRENATAL VIT/IRON FUM/FOLIC AC 27MG-0.8MG
1 TABLET ORAL DAILY
Status: DISCONTINUED | OUTPATIENT
Start: 2022-05-30 | End: 2022-06-01 | Stop reason: HOSPADM

## 2022-05-30 RX ORDER — SODIUM CHLORIDE 0.9 % (FLUSH) 0.9 %
10 SYRINGE (ML) INJECTION EVERY 12 HOURS SCHEDULED
Status: DISCONTINUED | OUTPATIENT
Start: 2022-05-30 | End: 2022-05-30 | Stop reason: HOSPADM

## 2022-05-30 RX ORDER — PROMETHAZINE HYDROCHLORIDE 12.5 MG/1
12.5 SUPPOSITORY RECTAL EVERY 6 HOURS PRN
Status: DISCONTINUED | OUTPATIENT
Start: 2022-05-30 | End: 2022-05-30 | Stop reason: HOSPADM

## 2022-05-30 RX ORDER — SODIUM CHLORIDE 0.9 % (FLUSH) 0.9 %
1-10 SYRINGE (ML) INJECTION AS NEEDED
Status: DISCONTINUED | OUTPATIENT
Start: 2022-05-30 | End: 2022-06-01 | Stop reason: HOSPADM

## 2022-05-30 RX ORDER — SODIUM CHLORIDE 0.9 % (FLUSH) 0.9 %
10 SYRINGE (ML) INJECTION AS NEEDED
Status: DISCONTINUED | OUTPATIENT
Start: 2022-05-30 | End: 2022-05-30 | Stop reason: HOSPADM

## 2022-05-30 RX ORDER — HYDROCORTISONE 25 MG/G
1 CREAM TOPICAL AS NEEDED
Status: DISCONTINUED | OUTPATIENT
Start: 2022-05-30 | End: 2022-06-01 | Stop reason: HOSPADM

## 2022-05-30 RX ORDER — LIDOCAINE HYDROCHLORIDE AND EPINEPHRINE 15; 5 MG/ML; UG/ML
INJECTION, SOLUTION EPIDURAL AS NEEDED
Status: DISCONTINUED | OUTPATIENT
Start: 2022-05-30 | End: 2022-05-30 | Stop reason: SURG

## 2022-05-30 RX ORDER — IBUPROFEN 600 MG/1
600 TABLET ORAL EVERY 6 HOURS PRN
Status: DISCONTINUED | OUTPATIENT
Start: 2022-05-30 | End: 2022-05-30 | Stop reason: HOSPADM

## 2022-05-30 RX ORDER — BISACODYL 10 MG
10 SUPPOSITORY, RECTAL RECTAL DAILY PRN
Status: DISCONTINUED | OUTPATIENT
Start: 2022-05-31 | End: 2022-06-01 | Stop reason: HOSPADM

## 2022-05-30 RX ORDER — METHYLERGONOVINE MALEATE 0.2 MG/ML
200 INJECTION INTRAVENOUS ONCE AS NEEDED
Status: DISCONTINUED | OUTPATIENT
Start: 2022-05-30 | End: 2022-05-30 | Stop reason: HOSPADM

## 2022-05-30 RX ORDER — CARBOPROST TROMETHAMINE 250 UG/ML
250 INJECTION, SOLUTION INTRAMUSCULAR AS NEEDED
Status: DISCONTINUED | OUTPATIENT
Start: 2022-05-30 | End: 2022-05-30 | Stop reason: HOSPADM

## 2022-05-30 RX ORDER — ACETAMINOPHEN 325 MG/1
650 TABLET ORAL EVERY 4 HOURS PRN
Status: DISCONTINUED | OUTPATIENT
Start: 2022-05-30 | End: 2022-05-30 | Stop reason: HOSPADM

## 2022-05-30 RX ORDER — DOCUSATE SODIUM 100 MG/1
100 CAPSULE, LIQUID FILLED ORAL 2 TIMES DAILY
Status: DISCONTINUED | OUTPATIENT
Start: 2022-05-30 | End: 2022-06-01 | Stop reason: HOSPADM

## 2022-05-30 RX ORDER — BUTORPHANOL TARTRATE 1 MG/ML
1 INJECTION, SOLUTION INTRAMUSCULAR; INTRAVENOUS
Status: DISCONTINUED | OUTPATIENT
Start: 2022-05-30 | End: 2022-05-30 | Stop reason: HOSPADM

## 2022-05-30 RX ORDER — MAGNESIUM CARB/ALUMINUM HYDROX 105-160MG
30 TABLET,CHEWABLE ORAL ONCE
Status: DISCONTINUED | OUTPATIENT
Start: 2022-05-30 | End: 2022-05-30 | Stop reason: HOSPADM

## 2022-05-30 RX ORDER — LIDOCAINE HYDROCHLORIDE 10 MG/ML
5 INJECTION, SOLUTION EPIDURAL; INFILTRATION; INTRACAUDAL; PERINEURAL AS NEEDED
Status: DISCONTINUED | OUTPATIENT
Start: 2022-05-30 | End: 2022-05-30 | Stop reason: HOSPADM

## 2022-05-30 RX ORDER — TERBUTALINE SULFATE 1 MG/ML
0.25 INJECTION, SOLUTION SUBCUTANEOUS AS NEEDED
Status: DISCONTINUED | OUTPATIENT
Start: 2022-05-30 | End: 2022-05-30 | Stop reason: HOSPADM

## 2022-05-30 RX ORDER — ONDANSETRON 2 MG/ML
4 INJECTION INTRAMUSCULAR; INTRAVENOUS EVERY 6 HOURS PRN
Status: DISCONTINUED | OUTPATIENT
Start: 2022-05-30 | End: 2022-05-30 | Stop reason: HOSPADM

## 2022-05-30 RX ADMIN — Medication 2 MILLI-UNITS/MIN: at 08:10

## 2022-05-30 RX ADMIN — ROPIVACAINE HYDROCHLORIDE 15 ML/HR: 2 INJECTION, SOLUTION EPIDURAL; INFILTRATION at 00:16

## 2022-05-30 RX ADMIN — SODIUM CHLORIDE, POTASSIUM CHLORIDE, SODIUM LACTATE AND CALCIUM CHLORIDE 125 ML/HR: 600; 310; 30; 20 INJECTION, SOLUTION INTRAVENOUS at 08:10

## 2022-05-30 RX ADMIN — ROPIVACAINE HYDROCHLORIDE 6 ML: 5 INJECTION, SOLUTION EPIDURAL; INFILTRATION; PERINEURAL at 00:12

## 2022-05-30 RX ADMIN — LIDOCAINE HYDROCHLORIDE AND EPINEPHRINE 2 ML: 15; 5 INJECTION, SOLUTION EPIDURAL at 00:10

## 2022-05-30 RX ADMIN — SODIUM CHLORIDE, POTASSIUM CHLORIDE, SODIUM LACTATE AND CALCIUM CHLORIDE 999 ML/HR: 600; 310; 30; 20 INJECTION, SOLUTION INTRAVENOUS at 03:42

## 2022-05-30 RX ADMIN — LIDOCAINE HYDROCHLORIDE AND EPINEPHRINE 3 ML: 15; 5 INJECTION, SOLUTION EPIDURAL at 00:08

## 2022-05-30 RX ADMIN — SODIUM CHLORIDE, POTASSIUM CHLORIDE, SODIUM LACTATE AND CALCIUM CHLORIDE 125 ML/HR: 600; 310; 30; 20 INJECTION, SOLUTION INTRAVENOUS at 00:32

## 2022-05-30 RX ADMIN — EPHEDRINE SULFATE 10 MG: 5 INJECTION INTRAVENOUS at 03:37

## 2022-05-30 RX ADMIN — Medication: at 16:57

## 2022-05-30 RX ADMIN — IBUPROFEN 600 MG: 600 TABLET, FILM COATED ORAL at 14:20

## 2022-05-30 RX ADMIN — Medication 85 ML/HR: at 14:21

## 2022-05-30 RX ADMIN — ROPIVACAINE HYDROCHLORIDE 15 ML/HR: 2 INJECTION, SOLUTION EPIDURAL; INFILTRATION at 07:12

## 2022-05-30 RX ADMIN — SODIUM CHLORIDE, POTASSIUM CHLORIDE, SODIUM LACTATE AND CALCIUM CHLORIDE 1000 ML: 600; 310; 30; 20 INJECTION, SOLUTION INTRAVENOUS at 00:00

## 2022-05-30 RX ADMIN — FENTANYL CITRATE 100 MCG: 50 INJECTION, SOLUTION INTRAMUSCULAR; INTRAVENOUS at 00:12

## 2022-05-30 RX ADMIN — IBUPROFEN 600 MG: 600 TABLET ORAL at 20:56

## 2022-05-30 RX ADMIN — Medication 1 APPLICATION: at 16:57

## 2022-05-30 RX ADMIN — WITCH HAZEL 1 PAD: 500 SOLUTION RECTAL; TOPICAL at 16:57

## 2022-05-30 RX ADMIN — DOCUSATE SODIUM 100 MG: 100 CAPSULE, LIQUID FILLED ORAL at 20:56

## 2022-05-30 NOTE — ANESTHESIA PROCEDURE NOTES
Labor Epidural      Patient reassessed immediately prior to procedure    Patient location during procedure: OB  Performed By  Anesthesiologist: Nasreen Salas DO  Preanesthetic Checklist  Completed: patient identified, IV checked, risks and benefits discussed, surgical consent, monitors and equipment checked, pre-op evaluation and timeout performed  Additional Notes  CSE performed using 25g Meenakshi  Prep:  Pt Position:sitting  Sterile Tech:cap, gloves, mask and sterile barrier  Prep:chlorhexidine gluconate and isopropyl alcohol  Monitoring:blood pressure monitoring  Epidural Block Procedure:  Approach:midline  Guidance:palpation technique  Location:L3-L4  Needle Type:Tuohy  Needle Gauge:17 G  Loss of Resistance Medium: air  Loss of Resistance: 5cm  Cath Depth at skin:12 cm  Paresthesia: none  Aspiration:negative  Test Dose:negative  Number of Attempts: 1  Post Assessment:  Dressing:occlusive dressing applied and secured with tape  Pt Tolerance:patient tolerated the procedure well with no apparent complications  Complications:no

## 2022-05-30 NOTE — ANESTHESIA PREPROCEDURE EVALUATION
Anesthesia Evaluation     Patient summary reviewed and Nursing notes reviewed                Airway   Mallampati: II  TM distance: >3 FB  Neck ROM: full  No difficulty expected  Dental      Pulmonary    (+) a smoker Current,   Cardiovascular - negative cardio ROS        Neuro/Psych  (+) headaches, psychiatric history ADD, Bipolar, Anxiety and Depression,    GI/Hepatic/Renal/Endo - negative ROS     Musculoskeletal (-) negative ROS    Abdominal    Substance History - negative use     OB/GYN    (+) Pregnant,         Other - negative ROS                     Anesthesia Plan    ASA 2     epidural       Anesthetic plan, all risks, benefits, and alternatives have been provided, discussed and informed consent has been obtained with: patient.        CODE STATUS:

## 2022-05-31 LAB
BASOPHILS # BLD AUTO: 0.05 10*3/MM3 (ref 0–0.2)
BASOPHILS NFR BLD AUTO: 0.3 % (ref 0–1.5)
DEPRECATED RDW RBC AUTO: 45.3 FL (ref 37–54)
EOSINOPHIL # BLD AUTO: 0.1 10*3/MM3 (ref 0–0.4)
EOSINOPHIL NFR BLD AUTO: 0.6 % (ref 0.3–6.2)
ERYTHROCYTE [DISTWIDTH] IN BLOOD BY AUTOMATED COUNT: 13.2 % (ref 12.3–15.4)
HCT VFR BLD AUTO: 30.3 % (ref 34–46.6)
HGB BLD-MCNC: 10.2 G/DL (ref 12–15.9)
IMM GRANULOCYTES # BLD AUTO: 0.1 10*3/MM3 (ref 0–0.05)
IMM GRANULOCYTES NFR BLD AUTO: 0.6 % (ref 0–0.5)
LYMPHOCYTES # BLD AUTO: 2.09 10*3/MM3 (ref 0.7–3.1)
LYMPHOCYTES NFR BLD AUTO: 13.2 % (ref 19.6–45.3)
MCH RBC QN AUTO: 31.5 PG (ref 26.6–33)
MCHC RBC AUTO-ENTMCNC: 33.7 G/DL (ref 31.5–35.7)
MCV RBC AUTO: 93.5 FL (ref 79–97)
MONOCYTES # BLD AUTO: 0.75 10*3/MM3 (ref 0.1–0.9)
MONOCYTES NFR BLD AUTO: 4.8 % (ref 5–12)
NEUTROPHILS NFR BLD AUTO: 12.69 10*3/MM3 (ref 1.7–7)
NEUTROPHILS NFR BLD AUTO: 80.5 % (ref 42.7–76)
NRBC BLD AUTO-RTO: 0 /100 WBC (ref 0–0.2)
PLATELET # BLD AUTO: 153 10*3/MM3 (ref 140–450)
PMV BLD AUTO: 12.2 FL (ref 6–12)
RBC # BLD AUTO: 3.24 10*6/MM3 (ref 3.77–5.28)
WBC NRBC COR # BLD: 15.78 10*3/MM3 (ref 3.4–10.8)

## 2022-05-31 PROCEDURE — 85025 COMPLETE CBC W/AUTO DIFF WBC: CPT | Performed by: ADVANCED PRACTICE MIDWIFE

## 2022-05-31 RX ORDER — FERROUS SULFATE 325(65) MG
325 TABLET ORAL 2 TIMES DAILY WITH MEALS
Status: DISCONTINUED | OUTPATIENT
Start: 2022-05-31 | End: 2022-06-01 | Stop reason: HOSPADM

## 2022-05-31 RX ADMIN — IBUPROFEN 600 MG: 600 TABLET ORAL at 20:51

## 2022-05-31 RX ADMIN — IBUPROFEN 600 MG: 600 TABLET ORAL at 14:47

## 2022-05-31 RX ADMIN — IBUPROFEN 600 MG: 600 TABLET ORAL at 02:36

## 2022-05-31 RX ADMIN — DOCUSATE SODIUM 100 MG: 100 CAPSULE, LIQUID FILLED ORAL at 20:51

## 2022-05-31 RX ADMIN — FERROUS SULFATE TAB 325 MG (65 MG ELEMENTAL FE) 325 MG: 325 (65 FE) TAB at 14:47

## 2022-05-31 RX ADMIN — PRENATAL VITAMINS-IRON FUMARATE 27 MG IRON-FOLIC ACID 0.8 MG TABLET 1 TABLET: at 08:11

## 2022-05-31 RX ADMIN — FERROUS SULFATE TAB 325 MG (65 MG ELEMENTAL FE) 325 MG: 325 (65 FE) TAB at 20:51

## 2022-05-31 RX ADMIN — IBUPROFEN 600 MG: 600 TABLET ORAL at 08:11

## 2022-05-31 RX ADMIN — DOCUSATE SODIUM 100 MG: 100 CAPSULE, LIQUID FILLED ORAL at 08:11

## 2022-05-31 NOTE — ANESTHESIA POSTPROCEDURE EVALUATION
Patient: Gabriela Epperson    Procedure Summary     Date: 05/30/22 Room / Location:     Anesthesia Start: 0001 Anesthesia Stop: 1257    Procedure: LABOR ANALGESIA Diagnosis:     Scheduled Providers:  Provider: Nasreen Salas DO    Anesthesia Type: epidural ASA Status: 2          Anesthesia Type: epidural    Vitals  Vitals Value Taken Time   /68 05/31/22 0800   Temp 98.2 °F (36.8 °C) 05/31/22 0800   Pulse 99 05/31/22 0800   Resp 18 05/31/22 0800   SpO2             Post Anesthesia Care and Evaluation    Patient location during evaluation: bedside  Patient participation: complete - patient participated  Level of consciousness: awake and alert  Pain management: adequate  Airway patency: patent  Anesthetic complications: No anesthetic complications    Cardiovascular status: acceptable  Respiratory status: acceptable  Hydration status: acceptable  Post Neuraxial Block status: Motor and sensory function returned to baseline and No signs or symptoms of PDPH

## 2022-06-01 VITALS
RESPIRATION RATE: 16 BRPM | DIASTOLIC BLOOD PRESSURE: 70 MMHG | TEMPERATURE: 98.1 F | HEART RATE: 77 BPM | SYSTOLIC BLOOD PRESSURE: 112 MMHG | WEIGHT: 144 LBS | BODY MASS INDEX: 23.14 KG/M2 | HEIGHT: 66 IN

## 2022-06-01 RX ADMIN — DOCUSATE SODIUM 100 MG: 100 CAPSULE, LIQUID FILLED ORAL at 07:33

## 2022-06-01 RX ADMIN — FERROUS SULFATE TAB 325 MG (65 MG ELEMENTAL FE) 325 MG: 325 (65 FE) TAB at 07:33

## 2022-06-01 RX ADMIN — PRENATAL VITAMINS-IRON FUMARATE 27 MG IRON-FOLIC ACID 0.8 MG TABLET 1 TABLET: at 07:33

## 2022-06-01 NOTE — DISCHARGE SUMMARY
[facility]  Vaginal Delivery Discharge Summary      Patient: Gabriela Epperson      MR#:[id number]  Admission  Diagnosis: Term Pregnancy  Discharge Diagnosis: Vaginal delivery    Date of Admission: 5/29/2022  Date of Discharge:  6/1/2022    Procedures:  Vaginal, Spontaneous     5/30/2022    12:57 PM      Service:  Obstetrics    Hospital Course:  Patient underwent vaginal delivery and remained in the hospital for 2 days.  During that time she remained afebrile and hemodynamically stable.  On the day of discharge, she was eating, ambulating and voiding without difficulty.      Lab Results   Component Value Date    WBC 15.78 (H) 05/31/2022    HGB 10.2 (L) 05/31/2022    HCT 30.3 (L) 05/31/2022    MCV 93.5 05/31/2022     05/31/2022    AST 21 03/17/2018    ALT 21 03/17/2018     Results from last 7 days   Lab Units 05/29/22  2318   ABO TYPING  A   RH TYPING  Positive   ANTIBODY SCREEN  Negative       Discharge Medications     Discharge Medications      Continue These Medications      Instructions Start Date   Prenatal 27-1 27-1 MG tablet tablet   1 tablet, Oral, Daily             Discharge Disposition:  To Home    Discharge Condition:  Stable    Discharge Diet: regular    Activity at Discharge: Pelvic rest    Follow-up Appointments  6 weeks      Lorin Mackay CNM  06/01/22  10:29 EDT

## 2022-06-01 NOTE — PROGRESS NOTES
[facility]  Vaginal Delivery Progress Note    Subjective     Doing well, pain controlled, lochia less than menses      Objective     Vital Signs Range for the last 24 hours  Temperature: Temp:  [97.8 °F (36.6 °C)-98.1 °F (36.7 °C)] 98.1 °F (36.7 °C)   Temp Source: Temp src: Oral   BP: BP: (110-112)/(59-70) 112/70   Pulse: Heart Rate:  [75-84] 77   Respirations: Resp:  [16] 16   SPO2:     O2 Amount (l/min):     O2 Devices           Physical Exam:  General:  no acute distresss.  Abdomen: Soft, non-tender, fundus firm  Extremities: normal, atraumatic, no cyanosis, and trace edema.       Lab results reviewed:  Yes    Lab Results   Component Value Date    WBC 15.78 (H) 2022    HGB 10.2 (L) 2022    HCT 30.3 (L) 2022    MCV 93.5 2022     2022         Assessment & Plan        (normal spontaneous vaginal delivery)    Postpartum anemia      Gabriela Epperson is Day 2  post-partum       Plan:  Discharge home with standard precautions and return to clinic in 4-6 weeks.      Lorin Mackay CNM  2022  10:28 EDT

## 2022-06-01 NOTE — PLAN OF CARE
Problem: Adult Inpatient Plan of Care  Goal: Plan of Care Review  Outcome: Met  Goal: Patient-Specific Goal (Individualized)  Outcome: Met  Goal: Absence of Hospital-Acquired Illness or Injury  Outcome: Met  Intervention: Identify and Manage Fall Risk  Recent Flowsheet Documentation  Taken 6/1/2022 0735 by Soheila Corrales RN  Safety Promotion/Fall Prevention: safety round/check completed  Taken 6/1/2022 0734 by Soheila Corrales RN  Safety Promotion/Fall Prevention: safety round/check completed  Intervention: Prevent and Manage VTE (Venous Thromboembolism) Risk  Recent Flowsheet Documentation  Taken 6/1/2022 0735 by Soheila Corrales RN  Activity Management: up ad fatoumata  Goal: Optimal Comfort and Wellbeing  Outcome: Met  Intervention: Provide Person-Centered Care  Recent Flowsheet Documentation  Taken 6/1/2022 0735 by Soheila Corrales RN  Trust Relationship/Rapport: care explained  Goal: Readiness for Transition of Care  Outcome: Met   Goal Outcome Evaluation:

## 2022-06-01 NOTE — PAYOR COMM NOTE
"Christa Mckeon (27 y.o. Female)     Auth#[id number]    Discharged 6/1/22 with Baby.    From: Sandi Burnette LPN, Utilization Review  Phone #[phone]  Fax #[phone]              Date of Birth   1995    Social Security Number       Address   [address] [place] [address]    Home Phone   [phone]    MRN   [id number]       Catholic   Quaker    Marital Status                               Admission Date   5/29/22    Admission Type   Elective    Admitting Provider   Daria Rangel MD    Attending Provider       Department, Room/Bed   [facility] MOTHER BABY 4A, N416/1       Discharge Date   6/1/2022    Discharge Disposition   Home or Self Care    Discharge Destination                               Attending Provider: (none)   Allergies: No Known Allergies    Isolation: None   Infection: None   Code Status: Prior   Advance Care Planning Activity    Ht: 167.6 cm (66\")   Wt: 65.3 kg (144 lb)    Admission Cmt: None   Principal Problem: None                Active Insurance as of 5/29/2022     Primary Coverage     Payor Plan Insurance Group Employer/Plan Group    [employer] [place] [employer]      Payor Plan Address Payor Plan Phone Number Payor Plan Fax Number Effective Dates    [address] [address] [phone]  11/23/2021 - None Entered    [facility] [id number]       Subscriber Name Subscriber Birth Date Member ID       CHRISTA MCKEON 1995 [id number]                 Emergency Contacts      (Rel.) Home Phone Work Phone Mobile Phone    Veronica Nava (Mother) -- -- [phone]    Tobias Montiel (Significant Other) -- -- [phone]               Operative/Procedure Notes (last 7 days)      Jackie Duenas CNM at 05/30/22 1313          [facility]  Vaginal Delivery Note   Review the Delivery Report for details.       Delivery     Delivery: Vaginal, Spontaneous     YOB: 2022    Time of Birth:  Gestational Age 12:57 PM   39w0d   "   Anesthesia: Epidural     Delivering clinician: Jackie Duenas    Forceps?   No   Vacuum? No    Shoulder dystocia present: No        Delivery narrative:  Patient at 39w0d pushed to deliver a viable female infant over an intact perineum with epidural anesthesia. Infant's head, anterior shoulder, and body delivered atraumatically. Vigorous infant was placed on mom's abdomen where the cord was allowed to stop pulsating and was clamped x2 and cut by FOB. Placenta delivered spontaneously and appeared to be intact. Perineum was inspected and a right labial abrasio was found but not repaired as it was hemostatc. EBL 50ml. Mother and infant stable, bonding.         Infant    Findings: female  infant     Infant observations: Weight: 2865 g (6 lb 5.1 oz)   Length: 19  in  Observations/Comments:        Apgars: 9  @ 1 minute /    9  @ 5 minutes   Infant Name: Jerilyn     Placenta, Cord, and Fluid    Placenta delivered  Spontaneous  at   5/30/2022  1:04 PM     Cord: 3 vessels  present.   Nuchal Cord?  no   Cord blood obtained: Yes    Cord gases obtained:  No              Repair    Episiotomy: None         Lacerations: Yes  Laceration Information  Laceration Repaired?   Perineal: None      Periurethral:       Labial: right  No    Sulcus:       Vaginal:       Cervical:            Estimated Blood Loss:               Quantitative Blood Loss:    QBL from VAG DEL: 50 (05/30/22 1313)             Complications  none    Disposition  Mother to Mother Baby/Postpartum  in stable condition currently.  Baby to remains with mom  in stable condition currently.      Jackie Duenas CNM  05/30/22  13:16 EDT        Electronically signed by Jackie Duenas CNM at 05/30/22 1316          Discharge Summary      Lorin Mackay CNM at 06/01/22 1029     Attestation signed by Mag Haley MD at 06/01/22 1253    I have reviewed this documentation and agree.                  [facility]  Vaginal Delivery Discharge Summary      Patient:  Gabriela Epperson      MR#:[id number]  Admission  Diagnosis: Term Pregnancy  Discharge Diagnosis: Vaginal delivery    Date of Admission: 5/29/2022  Date of Discharge:  6/1/2022    Procedures:  Vaginal, Spontaneous     5/30/2022    12:57 PM      Service:  Obstetrics    Hospital Course:  Patient underwent vaginal delivery and remained in the hospital for 2 days.  During that time she remained afebrile and hemodynamically stable.  On the day of discharge, she was eating, ambulating and voiding without difficulty.      Lab Results   Component Value Date    WBC 15.78 (H) 05/31/2022    HGB 10.2 (L) 05/31/2022    HCT 30.3 (L) 05/31/2022    MCV 93.5 05/31/2022     05/31/2022    AST 21 03/17/2018    ALT 21 03/17/2018     Results from last 7 days   Lab Units 05/29/22  2318   ABO TYPING  A   RH TYPING  Positive   ANTIBODY SCREEN  Negative       Discharge Medications     Discharge Medications      Continue These Medications      Instructions Start Date   Prenatal 27-1 27-1 MG tablet tablet   1 tablet, Oral, Daily             Discharge Disposition:  To Home    Discharge Condition:  Stable    Discharge Diet: regular    Activity at Discharge: Pelvic rest    Follow-up Appointments  6 weeks      Lorin Mackay CNM  06/01/22  10:29 EDT    Electronically signed by Mag Haley MD at 06/01/22 4960

## 2022-07-18 ENCOUNTER — TRANSCRIBE ORDERS (OUTPATIENT)
Dept: ADMINISTRATIVE | Facility: HOSPITAL | Age: 27
End: 2022-07-18

## 2022-07-18 DIAGNOSIS — Z11.59 SPECIAL SCREENING EXAMINATION FOR VIRAL DISEASE: Primary | ICD-10-CM

## 2022-09-06 ENCOUNTER — APPOINTMENT (OUTPATIENT)
Dept: PREADMISSION TESTING | Facility: HOSPITAL | Age: 27
End: 2022-09-06

## 2022-09-09 ENCOUNTER — LAB REQUISITION (OUTPATIENT)
Dept: LAB | Facility: HOSPITAL | Age: 27
End: 2022-09-09

## 2022-09-09 DIAGNOSIS — Z30.2 ENCOUNTER FOR STERILIZATION: ICD-10-CM

## 2022-09-09 PROCEDURE — 88302 TISSUE EXAM BY PATHOLOGIST: CPT | Performed by: OBSTETRICS & GYNECOLOGY

## 2022-09-12 LAB
CYTO UR: NORMAL
LAB AP CASE REPORT: NORMAL
LAB AP CLINICAL INFORMATION: NORMAL
PATH REPORT.FINAL DX SPEC: NORMAL
PATH REPORT.GROSS SPEC: NORMAL

## 2024-01-16 NOTE — ED NOTES
Bonita from behavioral health at bedside     Mag Houston, JULISA  03/17/18 5274     Detail Level: Detailed Detail Level: Generalized Detail Level: Zone